# Patient Record
Sex: FEMALE | Race: BLACK OR AFRICAN AMERICAN | NOT HISPANIC OR LATINO | URBAN - METROPOLITAN AREA
[De-identification: names, ages, dates, MRNs, and addresses within clinical notes are randomized per-mention and may not be internally consistent; named-entity substitution may affect disease eponyms.]

---

## 2017-10-13 NOTE — H&P ADULT - HISTORY OF PRESENT ILLNESS
*confirmed medications, instructed patient to bring in.       Pt is a 41 yo F PMHx HTN, asthma (controlled, denies intubations), PUD (s/p tx 2yrs ago), subacute hypothyroidism, known to cardiologist complaining of intermittent sharp chest pain for about 2 years, now worsening in the past 3 weeks. Over this time period, patient states that originally her MD attributed her CP to asthma, then HTN, then weight gain, however it did not resolve with weight loss, antihypertensives, or tx of asthma; including a steroid taper 2 wks ago. She states the chest pain is L-sided with radiation to the face with numbness, most often when she walks up stairs, and she becomes short of breath, fatigued, and it does not always resolve with rest. Her exercise tolerance has decreased as she used to be able to walk 3 miles and now has sx with 1 flight of stairs. She was Rx Naproxen which did not resolve sx and is currently on Ranexa (started 1 wk ago) which seems to improve the pain. Patient underwent NST 10/4/17: equivocal ECG response to a submaximal heart rate achieved with exercise. Abnormal myocardial perfusion study with moderate area of anterior wall reversible myocardial ischemia, normal LV function is present. In light of patients abnormal stress test and CCS III/IV angina equivalent symptoms, she is recommended for cardiac catheterization with intervention if clinically indicated. *confirmed medications, instructed patient to bring in.       Pt is a 43 yo F former smoker PMHx HTN, asthma (controlled, denies intubations), PUD (s/p tx 2yrs ago), subacute hypothyroidism, known to cardiologist complaining of intermittent sharp chest pain for about 2 years, now worsening in the past 3 weeks. Over this time period, patient states that originally her MD attributed her CP to asthma, then HTN, then weight gain, however it did not resolve with weight loss, antihypertensives, or tx of asthma; including a steroid taper 2 wks ago. She states the chest pain is L-sided with radiation to the face with numbness, most often when she walks up stairs, and she becomes short of breath, fatigued, and it does not always resolve with rest. Her exercise tolerance has decreased as she used to be able to walk 3 miles and now has sx with 1 flight of stairs. She was Rx Naproxen which did not resolve sx and is currently on Ranexa (started 1 wk ago) which seems to improve the pain. Denies syncope, HA, dizziness, lightheadedness, LOC, palpitations, n/v/d, fever, chills, diaphoresis, LE edema, orthopnea, PND. Patient underwent NST 10/4/17: equivocal ECG response to a submaximal heart rate achieved with exercise. Abnormal myocardial perfusion study with moderate area of anterior wall reversible myocardial ischemia, normal LV function is present. In light of patients abnormal stress test and CCS III/IV angina equivalent symptoms, she is recommended for cardiac catheterization with intervention if clinically indicated. 41 y/o F former smoker w/ PMHX HTN, Asthma (controlled, denies intubations), PUD (s/p treatment 2yrs ago, denies h/o GI bleeding), Subacute Hypothyroidism, known to her cardiologist for complaints of intermittent sharp C/P for about 2 years, now worsening in the past 3 weeks. Patient states that originally her MD attributed her CP to asthma, then to her HTN, then to her weight gain. However, symptoms did not resolve with weight loss, antihypertensive medications and treatment of her asthma including a steroid taper 2 weeks ago. She states her chest pain is L-sided with radiation to the face w/ associated numbness, and most often occurs when she walks 1 flight of stairs; however her C/P also occurs at rest (CCS Angina Class 4 Sx). She becomes SOB, fatigued, and states it does not always resolve with rest. Her exercise tolerance has decreased as she used to be able to walk 3 miles and now has symptoms with 1 flight of stairs. She was prescribed Naproxen which did not resolve symptoms and is currently on Ranexa (started 1 week ago) which seems to improve her C/P. Denies syncope, HA, dizziness, lightheadedness, palpitations, N/V, diaphoresis, LE edema, orthopnea, PND. Patient underwent NST 10/4/17: equivocal ECG response to a submaximal heart rate achieved with exercise. Abnormal myocardial perfusion study with moderate area of anterior wall reversible myocardial ischemia, normal LV function is present.     Due to patients risk factors, abnormal NST and CCS Class 4 anginal symptoms, she is recommended for cardiac catheterization w/ possible intervention.     Of Note, pt had a hysterectomy 6 and ½ yrs ago. 41 y/o F former smoker w/ PMHX HTN, Asthma (controlled, denies intubations), PUD (s/p treatment 2yrs ago, denies h/o GI bleeding), Subacute Hypothyroidism, known to her cardiologist for complaints of intermittent sharp C/P for about 2 years, now worsening in the past 3 weeks. Patient states that originally her MD attributed her CP to asthma, then to her HTN, then to her weight gain. However, symptoms did not resolve with weight loss, antihypertensive medications and treatment of her asthma including a steroid taper 2 weeks ago. She states her chest pain is L-sided with radiation to the face w/ associated numbness, and most often occurs when she walks 1 flight of stairs; however her C/P also occurs at rest (CCS Angina Class 4 Sx). She becomes SOB, fatigued, and states it does not always resolve with rest. Her exercise tolerance has decreased as she used to be able to walk 3 miles and now has symptoms with 1 flight of stairs. She was prescribed Naproxen which did not resolve symptoms and is currently on Ranexa (started 1 week ago) which seems to improve her C/P. Denies syncope, HA, dizziness, lightheadedness, palpitations, N/V, diaphoresis, LE edema, orthopnea, PND. Patient underwent Abnormal NST 10/4/17: equivocal ECG response to a submaximal heart rate achieved with exercise. Abnormal myocardial perfusion study with moderate area of anterior wall reversible myocardial ischemia, normal LV function is present.     Due to patients risk factors, abnormal NST and CCS Class 4 anginal symptoms, she is recommended for cardiac catheterization w/ possible intervention.     Of Note, pt had a hysterectomy 6 and ½ yrs ago.

## 2017-10-13 NOTE — H&P ADULT - NSHPLABSRESULTS_GEN_ALL_CORE
12.9   7.9   )-----------( 255      ( 16 Oct 2017 12:21 )             37.8     PT/INR - ( 16 Oct 2017 12:21 )   PT: 11.5 sec;   INR: 1.04          PTT - ( 16 Oct 2017 12:21 )  PTT:30.7 sec 12.9   7.9   )-----------( 255      ( 16 Oct 2017 12:21 )             37.8     PT/INR - ( 16 Oct 2017 12:21 )   PT: 11.5 sec;   INR: 1.04          PTT - ( 16 Oct 2017 12:21 )  PTT:30.7 sec    10-16    141  |  102  |  12  ----------------------------<  102<H>  3.7   |  25  |  0.87    Ca    9.6      16 Oct 2017 12:21    TPro  7.7  /  Alb  4.5  /  TBili  0.3  /  DBili  <0.2  /  AST  11  /  ALT  6<L>  /  AlkPhos  82  10-16

## 2017-10-13 NOTE — H&P ADULT - NSHPPHYSICALEXAM_GEN_ALL_CORE
V/S 	BP: 144/ 95	  HR: 80      RR: 16	T: 98	  O2: 98% RA   	  GEN: NAD  PULM:  CTA B/L  CARD: No JVD B/L, RRR, S1 and S2   ABD: +BS, NT, soft/ND	  EXT: No Edema B/L LE  NEURO: A+Ox3, no focal deficit  PULSES: 2+ Radial b/l, 2+ Femoral b/l (no bruit b/l), DP 2+ b/l, PT +Faint B/L  R and L Colten Test Passed  ASA III, Mallampati III  EKG V/S 	BP: 144/ 95	  HR: 80      RR: 16	T: 98	  O2: 98% RA   	  GEN: NAD  PULM:  CTA B/L  CARD: No JVD B/L, RRR, S1 and S2   ABD: +BS, NT, soft/ND	  EXT: No Edema B/L LE  NEURO: A+Ox3, no focal deficit  PULSES: 2+ Radial b/l, 2+ Femoral b/l (no bruit b/l), DP 2+ b/l, PT +Faint B/L  R and L Colten Test Passed  ASA III, Mallampati III  EKG NSR @ 71bpm, j point elevation V1-V3, TW flattening in III

## 2017-10-13 NOTE — H&P ADULT - ASSESSMENT
43 y/o F former smoker w/ PMHX HTN, Asthma, PUD, Subacute Hypothyroidism, CCS Angina Class 4 Sx, Abnormal NST    Due to patients risk factors, abnormal NST and CCS Class 4 anginal symptoms, she is recommended for cardiac catheterization w/ possible intervention.     Of Note, pt had a hysterectomy 6 and ½ yrs ago.      H/H 12.9/37. Pt denies bleeding, GI bleeding, hematemesis, hematuria, BRBPR or melena .   ASA 81mg taken this AM. Additional ASA 243mg and Plavix 600mh PO pre-cath.    Cr. IV NS@ 50 cc/hr pre-cath.  Risks & benefits of procedure and alternative therapy have been explained to the patient including but not limited to: allergic reaction, bleeding w/possible need for blood transfusion, infection, renal and vascular compromise, limb damage, arrhythmia, stroke, vessel dissection/perforation, Myocardial infarction, emergent CABG. Informed consent obtained and in chart. 43 y/o F former smoker w/ PMHX HTN, Asthma, PUD, Subacute Hypothyroidism, CCS Angina Class 4 Sx, Abnormal NST    Due to patients risk factors, abnormal NST and CCS Class 4 anginal symptoms, she is recommended for cardiac catheterization w/ possible intervention.     Of Note, pt had a hysterectomy 6 and ½ yrs ago.      H/H 12.9/37. Pt denies bleeding, GI bleeding, hematemesis, hematuria, BRBPR or melena .   ASA 81mg taken this AM. Additional ASA 243mg and Plavix 600mg PO pre-cath.    Cr. IV NS@ 50 cc/hr pre-cath.  Risks & benefits of procedure and alternative therapy have been explained to the patient including but not limited to: allergic reaction, bleeding w/possible need for blood transfusion, infection, renal and vascular compromise, limb damage, arrhythmia, stroke, vessel dissection/perforation, Myocardial infarction, emergent CABG. Informed consent obtained and in chart. 41 y/o F former smoker w/ PMHX HTN, Asthma, PUD, Subacute Hypothyroidism, CCS Angina Class 4 Sx, Abnormal NST    Due to patients risk factors, abnormal NST and CCS Class 4 anginal symptoms, she is recommended for cardiac catheterization w/ possible intervention.     Of Note, pt had a hysterectomy 6 and ½ yrs ago.      H/H 12.9/37. Pt denies bleeding, GI bleeding, hematemesis, hematuria, BRBPR or melena .   ASA 81mg taken this AM. Additional ASA 243mg and Plavix 600mg PO pre-cath.    Cr. 0.87, IV NS@ 50 cc/hr pre-cath.  Risks & benefits of procedure and alternative therapy have been explained to the patient including but not limited to: allergic reaction, bleeding w/possible need for blood transfusion, infection, renal and vascular compromise, limb damage, arrhythmia, stroke, vessel dissection/perforation, Myocardial infarction, emergent CABG. Informed consent obtained and in chart.

## 2017-10-16 ENCOUNTER — OUTPATIENT (OUTPATIENT)
Dept: OUTPATIENT SERVICES | Facility: HOSPITAL | Age: 42
LOS: 1 days | Discharge: NURSING, REHAB OR LONG-TERM CARE | End: 2017-10-16
Payer: COMMERCIAL

## 2017-10-16 DIAGNOSIS — Z90.710 ACQUIRED ABSENCE OF BOTH CERVIX AND UTERUS: Chronic | ICD-10-CM

## 2017-10-16 LAB
ALBUMIN SERPL ELPH-MCNC: 4.5 G/DL — SIGNIFICANT CHANGE UP (ref 3.3–5)
ALP SERPL-CCNC: 82 U/L — SIGNIFICANT CHANGE UP (ref 40–120)
ALT FLD-CCNC: 6 U/L — LOW (ref 10–45)
ANION GAP SERPL CALC-SCNC: 14 MMOL/L — SIGNIFICANT CHANGE UP (ref 5–17)
APTT BLD: 30.7 SEC — SIGNIFICANT CHANGE UP (ref 27.5–37.4)
AST SERPL-CCNC: 11 U/L — SIGNIFICANT CHANGE UP (ref 10–40)
BASOPHILS NFR BLD AUTO: 0.5 % — SIGNIFICANT CHANGE UP (ref 0–2)
BILIRUB SERPL-MCNC: 0.3 MG/DL — SIGNIFICANT CHANGE UP (ref 0.2–1.2)
BUN SERPL-MCNC: 12 MG/DL — SIGNIFICANT CHANGE UP (ref 7–23)
CALCIUM SERPL-MCNC: 9.6 MG/DL — SIGNIFICANT CHANGE UP (ref 8.4–10.5)
CHLORIDE SERPL-SCNC: 102 MMOL/L — SIGNIFICANT CHANGE UP (ref 96–108)
CHOLEST SERPL-MCNC: 169 MG/DL — SIGNIFICANT CHANGE UP (ref 10–199)
CK MB CFR SERPL CALC: 1.5 NG/ML — SIGNIFICANT CHANGE UP (ref 0–6.7)
CK SERPL-CCNC: 118 U/L — SIGNIFICANT CHANGE UP (ref 25–170)
CO2 SERPL-SCNC: 25 MMOL/L — SIGNIFICANT CHANGE UP (ref 22–31)
CREAT SERPL-MCNC: 0.87 MG/DL — SIGNIFICANT CHANGE UP (ref 0.5–1.3)
CRP SERPL-MCNC: 0.6 MG/DL — HIGH (ref 0–0.4)
EOSINOPHIL NFR BLD AUTO: 1.8 % — SIGNIFICANT CHANGE UP (ref 0–6)
GLUCOSE SERPL-MCNC: 102 MG/DL — HIGH (ref 70–99)
HBA1C BLD-MCNC: 5.5 % — SIGNIFICANT CHANGE UP (ref 4–5.6)
HCG SERPL-ACNC: <.1 MIU/ML — SIGNIFICANT CHANGE UP
HCT VFR BLD CALC: 37.8 % — SIGNIFICANT CHANGE UP (ref 34.5–45)
HDLC SERPL-MCNC: 60 MG/DL — SIGNIFICANT CHANGE UP (ref 40–125)
HGB BLD-MCNC: 12.9 G/DL — SIGNIFICANT CHANGE UP (ref 11.5–15.5)
INR BLD: 1.04 — SIGNIFICANT CHANGE UP (ref 0.88–1.16)
LIPID PNL WITH DIRECT LDL SERPL: 89 MG/DL — SIGNIFICANT CHANGE UP
LYMPHOCYTES # BLD AUTO: 27.1 % — SIGNIFICANT CHANGE UP (ref 13–44)
MCHC RBC-ENTMCNC: 27.9 PG — SIGNIFICANT CHANGE UP (ref 27–34)
MCHC RBC-ENTMCNC: 34.1 G/DL — SIGNIFICANT CHANGE UP (ref 32–36)
MCV RBC AUTO: 81.8 FL — SIGNIFICANT CHANGE UP (ref 80–100)
MONOCYTES NFR BLD AUTO: 7.1 % — SIGNIFICANT CHANGE UP (ref 2–14)
NEUTROPHILS NFR BLD AUTO: 63.5 % — SIGNIFICANT CHANGE UP (ref 43–77)
PLATELET # BLD AUTO: 255 K/UL — SIGNIFICANT CHANGE UP (ref 150–400)
POTASSIUM SERPL-MCNC: 3.7 MMOL/L — SIGNIFICANT CHANGE UP (ref 3.5–5.3)
POTASSIUM SERPL-SCNC: 3.7 MMOL/L — SIGNIFICANT CHANGE UP (ref 3.5–5.3)
PROT SERPL-MCNC: 7.7 G/DL — SIGNIFICANT CHANGE UP (ref 6–8.3)
PROTHROM AB SERPL-ACNC: 11.5 SEC — SIGNIFICANT CHANGE UP (ref 9.8–12.7)
RBC # BLD: 4.62 M/UL — SIGNIFICANT CHANGE UP (ref 3.8–5.2)
RBC # FLD: 13.8 % — SIGNIFICANT CHANGE UP (ref 10.3–16.9)
SODIUM SERPL-SCNC: 141 MMOL/L — SIGNIFICANT CHANGE UP (ref 135–145)
TOTAL CHOLESTEROL/HDL RATIO MEASUREMENT: 2.8 RATIO — LOW (ref 3.3–7.1)
TRIGL SERPL-MCNC: 99 MG/DL — SIGNIFICANT CHANGE UP (ref 10–149)
WBC # BLD: 7.9 K/UL — SIGNIFICANT CHANGE UP (ref 3.8–10.5)
WBC # FLD AUTO: 7.9 K/UL — SIGNIFICANT CHANGE UP (ref 3.8–10.5)

## 2017-10-16 PROCEDURE — 85025 COMPLETE CBC W/AUTO DIFF WBC: CPT

## 2017-10-16 PROCEDURE — 93458 L HRT ARTERY/VENTRICLE ANGIO: CPT | Mod: 26

## 2017-10-16 PROCEDURE — 85610 PROTHROMBIN TIME: CPT

## 2017-10-16 PROCEDURE — 93010 ELECTROCARDIOGRAM REPORT: CPT

## 2017-10-16 PROCEDURE — 82550 ASSAY OF CK (CPK): CPT

## 2017-10-16 PROCEDURE — 93458 L HRT ARTERY/VENTRICLE ANGIO: CPT

## 2017-10-16 PROCEDURE — C1887: CPT

## 2017-10-16 PROCEDURE — 85730 THROMBOPLASTIN TIME PARTIAL: CPT

## 2017-10-16 PROCEDURE — C1769: CPT

## 2017-10-16 PROCEDURE — 83036 HEMOGLOBIN GLYCOSYLATED A1C: CPT

## 2017-10-16 PROCEDURE — 93005 ELECTROCARDIOGRAM TRACING: CPT

## 2017-10-16 PROCEDURE — 84702 CHORIONIC GONADOTROPIN TEST: CPT

## 2017-10-16 PROCEDURE — 82553 CREATINE MB FRACTION: CPT

## 2017-10-16 PROCEDURE — 80053 COMPREHEN METABOLIC PANEL: CPT

## 2017-10-16 PROCEDURE — 86140 C-REACTIVE PROTEIN: CPT

## 2017-10-16 PROCEDURE — 82248 BILIRUBIN DIRECT: CPT

## 2017-10-16 PROCEDURE — 80061 LIPID PANEL: CPT

## 2017-10-16 RX ORDER — CLOPIDOGREL BISULFATE 75 MG/1
600 TABLET, FILM COATED ORAL ONCE
Qty: 0 | Refills: 0 | Status: COMPLETED | OUTPATIENT
Start: 2017-10-16 | End: 2017-10-16

## 2017-10-16 RX ORDER — ASPIRIN/CALCIUM CARB/MAGNESIUM 324 MG
243 TABLET ORAL ONCE
Qty: 0 | Refills: 0 | Status: COMPLETED | OUTPATIENT
Start: 2017-10-16 | End: 2017-10-16

## 2017-10-16 RX ORDER — MONTELUKAST 4 MG/1
1 TABLET, CHEWABLE ORAL
Qty: 0 | Refills: 0 | COMMUNITY

## 2017-10-16 RX ORDER — ASPIRIN/CALCIUM CARB/MAGNESIUM 324 MG
1 TABLET ORAL
Qty: 0 | Refills: 0 | COMMUNITY

## 2017-10-16 RX ORDER — SODIUM CHLORIDE 9 MG/ML
500 INJECTION INTRAMUSCULAR; INTRAVENOUS; SUBCUTANEOUS
Qty: 0 | Refills: 0 | Status: DISCONTINUED | OUTPATIENT
Start: 2017-10-16 | End: 2017-10-16

## 2017-10-16 RX ORDER — RANOLAZINE 500 MG/1
1 TABLET, FILM COATED, EXTENDED RELEASE ORAL
Qty: 0 | Refills: 0 | COMMUNITY

## 2017-10-16 RX ORDER — ALBUTEROL 90 UG/1
2 AEROSOL, METERED ORAL
Qty: 0 | Refills: 0 | COMMUNITY

## 2017-10-16 RX ORDER — AMLODIPINE BESYLATE 2.5 MG/1
1 TABLET ORAL
Qty: 0 | Refills: 0 | COMMUNITY

## 2017-10-16 RX ORDER — SODIUM CHLORIDE 9 MG/ML
1000 INJECTION INTRAMUSCULAR; INTRAVENOUS; SUBCUTANEOUS
Qty: 0 | Refills: 0 | Status: DISCONTINUED | OUTPATIENT
Start: 2017-10-16 | End: 2017-10-16

## 2017-10-16 RX ORDER — CHLORHEXIDINE GLUCONATE 213 G/1000ML
1 SOLUTION TOPICAL ONCE
Qty: 0 | Refills: 0 | Status: DISCONTINUED | OUTPATIENT
Start: 2017-10-16 | End: 2017-10-16

## 2017-10-16 RX ADMIN — Medication 243 MILLIGRAM(S): at 12:48

## 2017-10-16 RX ADMIN — CLOPIDOGREL BISULFATE 600 MILLIGRAM(S): 75 TABLET, FILM COATED ORAL at 12:48

## 2017-10-16 RX ADMIN — SODIUM CHLORIDE 50 MILLILITER(S): 9 INJECTION INTRAMUSCULAR; INTRAVENOUS; SUBCUTANEOUS at 12:48

## 2017-10-16 NOTE — CONSULT NOTE ADULT - SUBJECTIVE AND OBJECTIVE BOX
CHIEF COMPLAINT: currently denies complaint    HISTORY OF PRESENT ILLNESS:  Ms. Carpio is a 42 year-old female, former smoker, PMHx HTN, Asthma, H. Pylori PUD (s/p treatment 2yrs ago), subacute hypothyroidism, known to her cardiologist for complaints of intermittent sharp C/P for about 2 years, now worsening in the past 3 weeks. MD originally attributed CP to asthma, then to HTN, then to weight gain but symptoms did not resolve with weight loss, antihypertensive medications and treatment of her asthma including a steroid taper 2 weeks ago. Describes as L-sided with radiation to the face w/ associated numbness, and most often occurs when she walks 1 flight of stairs. She becomes SOB, fatigued, and states it does not always resolve with rest. ET limited to pain. No change with Naproxen, some relief with Ranexa. NST 10/4/17: equivocal ECG response to a submaximal heart rate achieved with exercise. Abnormal myocardial perfusion study with moderate area of anterior wall reversible myocardial ischemia, normal LV function is present.     Due to patients risk factors, abnormal NST and CCS Class 4 anginal symptoms, she is recommended for cardiac catheterization w/ possible intervention.     Cardiac cath showed non-obstructive CAD, normal LV function.     Now consulted for prevention.     PAST MEDICAL & SURGICAL HISTORY:  Fibroid  Asthma  HTN (hypertension)  H/O total hysterectomy     FAMILY HISTORY: maternal grandmother and uncle with "heart problems", denies first-degree CVD.     Allergies:   No Known Allergies    HOME MEDICATIONS:  Norvasc 5mg daily  ASA EC 81mg daily  Ranexa 500mg BId  Singulair 10mg daily  Duo-neb inhaler PRN	    PHYSICAL EXAM:  HR: 72  BP: 138/86  RR: 18  SpO2: 99% RA  Wt(kg): 216lbs  Height: 5'6"  BMI: 34.9  	  LABS:	                  12.9   7.9   )-----------( 255      ( 16 Oct 2017 12:21 )             37.8     10-    141  |  102  |  12  ----------------------------<  102<H>  3.7   |  25  |  0.87    Ca    9.6      16 Oct 2017 12:21    TPro  7.7  /  Alb  4.5  /  TBili  0.3  /  DBili  <0.2  /  AST  11  /  ALT  6<L>  /  AlkPhos  82  10-16      Hemoglobin A1C, Whole Blood: 5.5 % (10-16 @ 12:21)    Cholesterol, Serum: 169 mg/dL (10-16 @ 12:21)  HDL Cholesterol, Serum: 60 mg/dL (10-16 @ 12:21)  Triglycerides, Serum: 99 mg/dL (10-16 @ 12:21)  Direct LDL: 89 mg/dL (10-16 @ 12:21)    C-Reactive Protein, Serum: 0.60 mg/dL (10-16 @ 12:21)    10 "S" ASSESSMENT PLAN: SMOKING, SITTING, SUGAR, SALT, SOME FATS, SOCIAL, SLEEP, SIGNS, AND MEDS:  Tobacco usage: previous smoker, quit 15 years ago  Stress: moderate to high -- actively  from , apartment lease issues, finances, bedbugs, sister just  a month ago, 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   ETOH: denies  Caffeine: 2 cups coffee/day with 3Tbsp sugar, creamer, and vanilla  Hormone Replacement: Has been on Prempro since her hysterectomy, but due to cost she has only completed 3 packets since surgery.   Sleep Disorder:   Inflammatory Condition:  Activity Level:  Current Diet:  Heart Failure:  Myopathy with Statins:  GI/ Issues:    ASSESSMENT/RECOMMENDATIONS: 	  Summary:     RECOMMENDATIONS:   Anti-platelet Therapy: DAPT per interventionalist recommendation.   Lipid Therapy:  Beta Blocker Therapy: Continue current therapy per your discretion.   ACE/ARB Therapy: Continue current therapy per your discretion.   Diet: Low-sodium, low-carbohydrate, Mediterranean diet. Written instruction on the Mediterranean diet was provided.   Exercise: 30-45 minutes most days of the week once cleared to do so by their referring cardiologist.   Smoking: This patient is a non-smoker.   Stress Management: Instruction on mindfulness meditation was provided.   Sleep:  Other:    Thank you for the opportunity to see this patient. Please feel free to contact [facility] if there are any questions, or if you feel that your patient would benefit from continued follow-up visits with the Program. CHIEF COMPLAINT: currently denies complaint    HISTORY OF PRESENT ILLNESS:  Ms. Carpio is a 42 year-old female, former smoker, PMHx HTN, Asthma, H. Pylori PUD (s/p treatment 2yrs ago), subacute hypothyroidism, known to her cardiologist for complaints of intermittent sharp C/P for about 2 years, now worsening in the past 3 weeks. MD originally attributed CP to asthma, then to HTN, then to weight gain but symptoms did not resolve with weight loss, antihypertensive medications and treatment of her asthma including a steroid taper 2 weeks ago. Describes as L-sided with radiation to the face w/ associated numbness, and most often occurs when she walks 1 flight of stairs. She becomes SOB, fatigued, and states it does not always resolve with rest. ET limited to pain. No change with Naproxen, some relief with Ranexa. NST 10/4/17: equivocal ECG response to a submaximal heart rate achieved with exercise. Abnormal myocardial perfusion study with moderate area of anterior wall reversible myocardial ischemia, normal LV function is present.     Due to patients risk factors, abnormal NST and CCS Class 4 anginal symptoms, she is recommended for cardiac catheterization w/ possible intervention.     Cardiac cath showed non-obstructive CAD, normal LV function.     Now consulted for prevention.     PAST MEDICAL & SURGICAL HISTORY:  Fibroid  Asthma  HTN (hypertension)  H/O total hysterectomy     FAMILY HISTORY: maternal grandmother and uncle with "heart problems", denies first-degree CVD.     Allergies:   No Known Allergies    HOME MEDICATIONS:  Norvasc 5mg daily  ASA EC 81mg daily  Ranexa 500mg BId  Singulair 10mg daily  Duo-neb inhaler PRN	    PHYSICAL EXAM:  HR: 72  BP: 138/86  RR: 18  SpO2: 99% RA  Wt(kg): 216lbs  Height: 5'6"  BMI: 34.9  	  LABS:	                  12.9   7.9   )-----------( 255      ( 16 Oct 2017 12:21 )             37.8     10-    141  |  102  |  12  ----------------------------<  102<H>  3.7   |  25  |  0.87    Ca    9.6      16 Oct 2017 12:21    TPro  7.7  /  Alb  4.5  /  TBili  0.3  /  DBili  <0.2  /  AST  11  /  ALT  6<L>  /  AlkPhos  82  10-16      Hemoglobin A1C, Whole Blood: 5.5 % (10-16 @ 12:21)    Cholesterol, Serum: 169 mg/dL (10-16 @ 12:21)  HDL Cholesterol, Serum: 60 mg/dL (10-16 @ 12:21)  Triglycerides, Serum: 99 mg/dL (10-16 @ 12:21)  Direct LDL: 89 mg/dL (10-16 @ 12:21)    C-Reactive Protein, Serum: 0.60 mg/dL (10-16 @ 12:21)    10 "S" ASSESSMENT PLAN: SMOKING, SITTING, SUGAR, SALT, SOME FATS, SOCIAL, SLEEP, SIGNS, AND MEDS:  Tobacco usage: previous smoker, quit 15 years ago  Stress: moderate to high -- actively  from , apartment lease issues, finances, bedbugs, sister just  a month ago, 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   ETOH: denies  Caffeine: 2 cups coffee/day with 3Tbsp sugar, creamer, and vanilla  Hormone Replacement: Has been on Prempro since her hysterectomy, but due to cost she has only completed 3 packets since surgery.   Sleep Disorder: 6 hrs/night, "always tired", (+) snoring, had a sleep study early  with no dx KORY.   Inflammatory Condition: denies RA, lupus, connective tissue disorder  Activity Level: denies receiving daily exercise.   Current Diet:  Br: sesame seed bagel with butter, 2 fried eggs with WW toast.   Lunch: Baked fish or chicken, quinoa,  [employer] meals and [employer] Fried Rice; says she has "cut down" on fried foods and still orders take out from time to time  Dinner: baked fish/chicken, steamed vegetables, sometimes spaghetti, says she makes sure to limit/use no salt when cooking at home.   Snacks/desserts: states "I am a sweet eater": chocolate, ice creams, donuts, brownies, cakes, 3 Musketeers, cheesecake. Makes smoothies with OJ/apple juice base with berries and veggies  Heart Failure: denies sx's  Myopathy with Statins: n/a  GI/ Issues: denies, intermittent reflux Rx PPI.   Pre-eclampsia/Pre-term births- denies    ASSESSMENT/RECOMMENDATIONS: 	  Summary:   42 year-old female, former smoker, PMHx HTN, Asthma with atypical chest pain and abnormal stress now s/p diagnostic cardiac cath with non-obstructive CAD and normal LV function. Consulted for cardiac prevention.     PMD: Dr. Frederick     RECOMMENDATIONS:   Anti-platelet Therapy: continue ASA EC 81mg daily   Lipid Therapy: no need to initiate statin at this time.   CCB: continue Norvasc per your discretion.   Diet: Low-sodium, low-carbohydrate, Mediterranean diet. Written instruction on the Mediterranean diet was provided. Discussed the hidden sugars and sodium she is getting in her diet. Talked about healthier ways to enjoy chocolate and sweets.   Exercise: 30-45 minutes most days of the week once cleared to do so by their referring cardiologist. Goal to lose 5-10 pounds in the next month.   Smoking: This patient is a non-smoker.   Stress Management: Instruction on mindfulness meditation was provided. Talked about making time for herself during the day (i.e. downtime to reflect and de-stress through exercise), as well as not stretching herself too thin with activities, work, school, and business ventures which can lead to stress, anxiety, and chest pain. Stressed importance of physical activity.   Sleep: Aim for 6-8 hours a night. Consider having another sleep study in the next few years if you remain tired throughout the day after diet and lifestyle modifications.   Blood sugars: A1C 5.5 and fasting FSBS 102. Talked about excess refined sugars, insulin use, and diabetes development. Consider an Insulin level given high FSBS.   Other: low-moderate 10yr cvd risk -- lifestyle modifications including diet and exercise are imperative at this point.     Thank you for the opportunity to see this patient. Please feel free to contact [facility] if there are any questions, or if you feel that your patient would benefit from continued follow-up visits with the Program. CHIEF COMPLAINT: currently denies complaint    HISTORY OF PRESENT ILLNESS:  Ms. Caprio is a 42 year-old female, former smoker, PMHx HTN, Asthma, H. Pylori PUD (s/p treatment 2yrs ago), subacute hypothyroidism, known to her cardiologist for complaints of intermittent sharp C/P for about 2 years, now worsening in the past 3 weeks. MD originally attributed CP to asthma, then to HTN, then to weight gain but symptoms did not resolve with weight loss, antihypertensive medications and treatment of her asthma including a steroid taper 2 weeks ago. Describes as L-sided with radiation to the face w/ associated numbness, and most often occurs when she walks 1 flight of stairs. She becomes SOB, fatigued, and states it does not always resolve with rest. ET limited to pain. No change with Naproxen, some relief with Ranexa. NST 10/4/17: equivocal ECG response to a submaximal heart rate achieved with exercise. Abnormal myocardial perfusion study with moderate area of anterior wall reversible myocardial ischemia, normal LV function is present.     Due to patients risk factors, abnormal NST and CCS Class 4 anginal symptoms, she is recommended for cardiac catheterization w/ possible intervention.     Cardiac cath showed non-obstructive CAD, normal LV function.     Now consulted for prevention.     PAST MEDICAL & SURGICAL HISTORY:  Fibroid  Asthma  HTN (hypertension)  H/O total hysterectomy     FAMILY HISTORY: maternal grandmother and uncle with "heart problems", denies first-degree CVD.     Allergies:   No Known Allergies    HOME MEDICATIONS:  Norvasc 5mg daily  ASA EC 81mg daily  Ranexa 500mg BId  Singulair 10mg daily  Duo-neb inhaler PRN	    PHYSICAL EXAM:  HR: 72  BP: 138/86  RR: 18  SpO2: 99% RA  Wt(kg): 216lbs  Height: 5'6"  BMI: 34.9  	  LABS:	                  12.9   7.9   )-----------( 255      ( 16 Oct 2017 12:21 )             37.8     10-    141  |  102  |  12  ----------------------------<  102<H>  3.7   |  25  |  0.87    Ca    9.6      16 Oct 2017 12:21    TPro  7.7  /  Alb  4.5  /  TBili  0.3  /  DBili  <0.2  /  AST  11  /  ALT  6<L>  /  AlkPhos  82  10-16      Hemoglobin A1C, Whole Blood: 5.5 % (10-16 @ 12:21)    Cholesterol, Serum: 169 mg/dL (10-16 @ 12:21)  HDL Cholesterol, Serum: 60 mg/dL (10-16 @ 12:21)  Triglycerides, Serum: 99 mg/dL (10-16 @ 12:21)  Direct LDL: 89 mg/dL (10-16 @ 12:21)    C-Reactive Protein, Serum: 0.60 mg/dL (10-16 @ 12:21)    10 "S" ASSESSMENT PLAN: SMOKING, SITTING, SUGAR, SALT, SOME FATS, SOCIAL, SLEEP, SIGNS, AND MEDS:  Tobacco usage: previous smoker, quit 15 years ago  Stress: moderate to high -- actively  from , apartment lease issues, finances, bedbugs, sister just  a month ago, 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   ETOH: denies  Caffeine: 2 cups coffee/day with 3Tbsp sugar, creamer, and vanilla  Hormone Replacement: Has been on Prempro since her hysterectomy, but due to cost she has only completed 3 packets since surgery.   Sleep Disorder: 6 hrs/night, "always tired", (+) snoring, had a sleep study early  with no dx KORY.   Inflammatory Condition: denies RA, lupus, connective tissue disorder  Activity Level: denies receiving daily exercise.   Current Diet:  Br: sesame seed bagel with butter, 2 fried eggs with WW toast.   Lunch: Baked fish or chicken, quinoa,  [employer] meals and [employer] Fried Rice; says she has "cut down" on fried foods and still orders take out from time to time  Dinner: baked fish/chicken, steamed vegetables, sometimes spaghetti, says she makes sure to limit/use no salt when cooking at home.   Snacks/desserts: states "I am a sweet eater": chocolate, ice creams, donuts, brownies, cakes, 3 Musketeers, cheesecake. Makes smoothies with OJ/apple juice base with berries and veggies  Heart Failure: denies sx's  Myopathy with Statins: n/a  GI/ Issues: denies, intermittent reflux Rx PPI.   Pre-eclampsia/Pre-term births- denies    ASSESSMENT/RECOMMENDATIONS: 	  Summary:   42 year-old female, former smoker, PMHx HTN, Asthma with atypical chest pain and abnormal stress now s/p diagnostic cardiac cath with non-obstructive CAD and normal LV function. Consulted for cardiac prevention.     PMD: Dr. Chino Arauz   [address] [place] [address]  Phone: [phone]    RECOMMENDATIONS:   Anti-platelet Therapy: continue ASA EC 81mg daily   Lipid Therapy: no need to initiate statin at this time.   CCB: continue Norvasc per your discretion.   Diet: Low-sodium, low-carbohydrate, Mediterranean diet. Written instruction on the Mediterranean diet was provided. Discussed the hidden sugars and sodium she is getting in her diet. Talked about healthier ways to enjoy chocolate and sweets.   Exercise: 30-45 minutes most days of the week once cleared to do so by their referring cardiologist. Goal to lose 5-10 pounds in the next month.   Smoking: This patient is a non-smoker.   Stress Management: Instruction on mindfulness meditation was provided. Talked about making time for herself during the day (i.e. downtime to reflect and de-stress through exercise), as well as not stretching herself too thin with activities, work, school, and business ventures which can lead to stress, anxiety, and chest pain. Stressed importance of physical activity.   Sleep: Aim for 6-8 hours a night. Consider having another sleep study in the next few years if you remain tired throughout the day after diet and lifestyle modifications.   Blood sugars: A1C 5.5 and fasting FSBS 102. Talked about excess refined sugars, insulin use, and diabetes development. Consider an Insulin level given high FSBS.   Other: low-moderate 10yr cvd risk -- lifestyle modifications including diet and exercise are imperative at this point.     Thank you for the opportunity to see this patient. Please feel free to contact [facility] if there are any questions, or if you feel that your patient would benefit from continued follow-up visits with the Program.

## 2017-10-16 NOTE — PROGRESS NOTE ADULT - SUBJECTIVE AND OBJECTIVE BOX
Interventional Cardiology HERLINDA MERRILLA Discharge Note    Patient without complaints. Ambulated and voided without difficulties    Afebrile, VSS    Ext:  Right  Radial : no hematoma,  no   bleeding, dressing; C/D/I    Pulses:    intact RAD 2+     A/P:    43 y/o F former smoker w/ PMHX HTN, Asthma, PUD, Subacute Hypothyroidism, CCS Angina Class 4 Sx, Abnormal NST.   cardiac cath 10/16/17: normal coronaries, normal EF, R radial access    1.	Stable for discharge as per attending Dr. FIDEL Morataya  after bed rest, pt voids, wrist stable and 30 minutes of ambulation.  2.	Follow-up with PMD/Cardiologist Regla  in 1-2 weeks  3.	Discharged forms signed and copies in chart, stop ranexa

## 2017-10-19 DIAGNOSIS — I25.10 ATHEROSCLEROTIC HEART DISEASE OF NATIVE CORONARY ARTERY WITHOUT ANGINA PECTORIS: ICD-10-CM

## 2017-10-19 DIAGNOSIS — R94.39 ABNORMAL RESULT OF OTHER CARDIOVASCULAR FUNCTION STUDY: ICD-10-CM

## 2017-11-22 ENCOUNTER — APPOINTMENT (OUTPATIENT)
Dept: HEART AND VASCULAR | Facility: CLINIC | Age: 42
End: 2017-11-22

## 2018-09-24 PROBLEM — D25.9 LEIOMYOMA OF UTERUS, UNSPECIFIED: Chronic | Status: ACTIVE | Noted: 2017-10-13

## 2018-09-24 PROBLEM — J45.909 UNSPECIFIED ASTHMA, UNCOMPLICATED: Chronic | Status: ACTIVE | Noted: 2017-10-13

## 2018-09-24 PROBLEM — I10 ESSENTIAL (PRIMARY) HYPERTENSION: Chronic | Status: ACTIVE | Noted: 2017-10-13

## 2018-09-27 ENCOUNTER — APPOINTMENT (OUTPATIENT)
Dept: HEART AND VASCULAR | Facility: CLINIC | Age: 43
End: 2018-09-27
Payer: COMMERCIAL

## 2018-09-27 ENCOUNTER — LABORATORY RESULT (OUTPATIENT)
Age: 43
End: 2018-09-27

## 2018-09-27 VITALS — SYSTOLIC BLOOD PRESSURE: 140 MMHG | DIASTOLIC BLOOD PRESSURE: 90 MMHG

## 2018-09-27 VITALS — WEIGHT: 212 LBS | HEIGHT: 66 IN | BODY MASS INDEX: 34.07 KG/M2

## 2018-09-27 DIAGNOSIS — Z87.891 PERSONAL HISTORY OF NICOTINE DEPENDENCE: ICD-10-CM

## 2018-09-27 DIAGNOSIS — Z82.49 FAMILY HISTORY OF ISCHEMIC HEART DISEASE AND OTHER DISEASES OF THE CIRCULATORY SYSTEM: ICD-10-CM

## 2018-09-27 DIAGNOSIS — Z86.018 PERSONAL HISTORY OF OTHER BENIGN NEOPLASM: ICD-10-CM

## 2018-09-27 DIAGNOSIS — Z87.09 PERSONAL HISTORY OF OTHER DISEASES OF THE RESPIRATORY SYSTEM: ICD-10-CM

## 2018-09-27 PROCEDURE — 99214 OFFICE O/P EST MOD 30 MIN: CPT

## 2018-09-27 PROCEDURE — 93000 ELECTROCARDIOGRAM COMPLETE: CPT

## 2018-09-28 LAB
24R-OH-CALCIDIOL SERPL-MCNC: 79 PG/ML
ALBUMIN SERPL ELPH-MCNC: 4.3 G/DL
ALP BLD-CCNC: 83 U/L
ALT SERPL-CCNC: 6 U/L
ANION GAP SERPL CALC-SCNC: 14 MMOL/L
AST SERPL-CCNC: 13 U/L
BASOPHILS # BLD AUTO: 0.03 K/UL
BASOPHILS NFR BLD AUTO: 0.4 %
BILIRUB SERPL-MCNC: 0.3 MG/DL
BUN SERPL-MCNC: 11 MG/DL
CALCIUM SERPL-MCNC: 9.5 MG/DL
CHLORIDE SERPL-SCNC: 101 MMOL/L
CHOLEST SERPL-MCNC: 159 MG/DL
CHOLEST/HDLC SERPL: 2.8 RATIO
CO2 SERPL-SCNC: 27 MMOL/L
CREAT SERPL-MCNC: 0.8 MG/DL
EOSINOPHIL # BLD AUTO: 0.08 K/UL
EOSINOPHIL NFR BLD AUTO: 1 %
GLUCOSE SERPL-MCNC: 96 MG/DL
HBA1C MFR BLD HPLC: 5.8 %
HCT VFR BLD CALC: 38.8 %
HDLC SERPL-MCNC: 56 MG/DL
HGB BLD-MCNC: 11.8 G/DL
IMM GRANULOCYTES NFR BLD AUTO: 0.3 %
LDLC SERPL CALC-MCNC: 92 MG/DL
LYMPHOCYTES # BLD AUTO: 2.41 K/UL
LYMPHOCYTES NFR BLD AUTO: 31 %
MAN DIFF?: NORMAL
MCHC RBC-ENTMCNC: 25.7 PG
MCHC RBC-ENTMCNC: 30.4 GM/DL
MCV RBC AUTO: 84.5 FL
MONOCYTES # BLD AUTO: 0.56 K/UL
MONOCYTES NFR BLD AUTO: 7.2 %
NEUTROPHILS # BLD AUTO: 4.67 K/UL
NEUTROPHILS NFR BLD AUTO: 60.1 %
PLATELET # BLD AUTO: 295 K/UL
POTASSIUM SERPL-SCNC: 3.7 MMOL/L
PROT SERPL-MCNC: 7.2 G/DL
RBC # BLD: 4.59 M/UL
RBC # FLD: 15.8 %
SODIUM SERPL-SCNC: 142 MMOL/L
TRIGL SERPL-MCNC: 55 MG/DL
WBC # FLD AUTO: 7.77 K/UL

## 2018-10-03 LAB
FOLATE SERPL-MCNC: 9.1 NG/ML
T3RU NFR SERPL: 1.04 INDEX
T4 FREE SERPL-MCNC: 1.4 NG/DL
T4 SERPL-MCNC: 8.2 UG/DL
TSH SERPL-ACNC: 0.69 UIU/ML
VIT B12 SERPL-MCNC: 548 PG/ML

## 2019-01-11 ENCOUNTER — APPOINTMENT (OUTPATIENT)
Dept: HEART AND VASCULAR | Facility: CLINIC | Age: 44
End: 2019-01-11
Payer: COMMERCIAL

## 2019-01-11 VITALS
WEIGHT: 212 LBS | BODY MASS INDEX: 34.07 KG/M2 | DIASTOLIC BLOOD PRESSURE: 100 MMHG | HEART RATE: 72 BPM | HEIGHT: 66 IN | RESPIRATION RATE: 12 BRPM | SYSTOLIC BLOOD PRESSURE: 160 MMHG

## 2019-01-11 PROCEDURE — 99214 OFFICE O/P EST MOD 30 MIN: CPT

## 2019-01-11 RX ORDER — TETRACYCLINE HYDROCHLORIDE 250 MG/1
250 CAPSULE ORAL
Qty: 20 | Refills: 0 | Status: DISCONTINUED | COMMUNITY
Start: 2018-10-03

## 2019-01-11 NOTE — PHYSICAL EXAM
[General Appearance - Well Developed] : well developed [Normal Appearance] : normal appearance [Well Groomed] : well groomed [General Appearance - Well Nourished] : well nourished [No Deformities] : no deformities [General Appearance - In No Acute Distress] : no acute distress [Normal Oral Mucosa] : normal oral mucosa [No Oral Pallor] : no oral pallor [No Oral Cyanosis] : no oral cyanosis [Normal Jugular Venous A Waves Present] : normal jugular venous A waves present [Normal Jugular Venous V Waves Present] : normal jugular venous V waves present [No Jugular Venous Bermudez A Waves] : no jugular venous bermudez A waves [Respiration, Rhythm And Depth] : normal respiratory rhythm and effort [Exaggerated Use Of Accessory Muscles For Inspiration] : no accessory muscle use [Auscultation Breath Sounds / Voice Sounds] : lungs were clear to auscultation bilaterally [Heart Rate And Rhythm] : heart rate and rhythm were normal [Heart Sounds] : normal S1 and S2 [Arterial Pulses Normal] : the arterial pulses were normal [Edema] : no peripheral edema present [Abdomen Soft] : soft [Abdomen Tenderness] : non-tender [Abdomen Mass (___ Cm)] : no abdominal mass palpated [Abnormal Walk] : normal gait [Gait - Sufficient For Exercise Testing] : the gait was sufficient for exercise testing [Nail Clubbing] : no clubbing of the fingernails [Cyanosis, Localized] : no localized cyanosis [Petechial Hemorrhages (___cm)] : no petechial hemorrhages [] : no ischemic changes

## 2019-01-11 NOTE — DISCUSSION/SUMMARY
[FreeTextEntry1] : 1. Hypertension: Will restart lisinopril 20 mg daily in conjunction with Norvasc 5 mg daily advised low salt diet.\par 2. Hypothyroidism: Will repeat TFTs and reorder Synthroid.

## 2019-01-11 NOTE — HISTORY OF PRESENT ILLNESS
[FreeTextEntry1] : 43-year-old female with a past medical history of hypertension comes in for evaluation of high blood pressure. Patient went for preadmission testing found to have systolic pressure 160/100. Patient does report mild headaches on occasion. However, does admit to excessive stress in her life at this time. Patient also reports not taking Synthroid for some time.

## 2019-01-14 LAB
25(OH)D3 SERPL-MCNC: 11.8 NG/ML
ALBUMIN SERPL ELPH-MCNC: 4.9 G/DL
ALP BLD-CCNC: 91 U/L
ALT SERPL-CCNC: 11 U/L
ANION GAP SERPL CALC-SCNC: 14 MMOL/L
AST SERPL-CCNC: 11 U/L
BASOPHILS # BLD AUTO: 0.03 K/UL
BASOPHILS NFR BLD AUTO: 0.4 %
BILIRUB SERPL-MCNC: 0.4 MG/DL
BUN SERPL-MCNC: 11 MG/DL
CALCIUM SERPL-MCNC: 9.5 MG/DL
CHLORIDE SERPL-SCNC: 101 MMOL/L
CHOLEST SERPL-MCNC: 182 MG/DL
CHOLEST/HDLC SERPL: 2.7 RATIO
CO2 SERPL-SCNC: 25 MMOL/L
CREAT SERPL-MCNC: 0.86 MG/DL
EOSINOPHIL # BLD AUTO: 0.07 K/UL
EOSINOPHIL NFR BLD AUTO: 1 %
FOLATE SERPL-MCNC: 10.1 NG/ML
GLUCOSE SERPL-MCNC: 107 MG/DL
HBA1C MFR BLD HPLC: 5.8 %
HCT VFR BLD CALC: 42.1 %
HDLC SERPL-MCNC: 68 MG/DL
HGB BLD-MCNC: 13.7 G/DL
IMM GRANULOCYTES NFR BLD AUTO: 0.3 %
LDLC SERPL CALC-MCNC: 101 MG/DL
LYMPHOCYTES # BLD AUTO: 2.27 K/UL
LYMPHOCYTES NFR BLD AUTO: 31.3 %
MAGNESIUM SERPL-MCNC: 2.2 MG/DL
MAN DIFF?: NORMAL
MCHC RBC-ENTMCNC: 27.7 PG
MCHC RBC-ENTMCNC: 32.5 GM/DL
MCV RBC AUTO: 85.2 FL
MONOCYTES # BLD AUTO: 0.4 K/UL
MONOCYTES NFR BLD AUTO: 5.5 %
NEUTROPHILS # BLD AUTO: 4.46 K/UL
NEUTROPHILS NFR BLD AUTO: 61.5 %
PHOSPHATE SERPL-MCNC: 3.1 MG/DL
PLATELET # BLD AUTO: 312 K/UL
POTASSIUM SERPL-SCNC: 3.6 MMOL/L
PROT SERPL-MCNC: 7.7 G/DL
RBC # BLD: 4.94 M/UL
RBC # FLD: 14.5 %
SODIUM SERPL-SCNC: 140 MMOL/L
T3FREE SERPL-MCNC: 3.01 PG/ML
T4 FREE SERPL-MCNC: 1.2 NG/DL
T4 SERPL-MCNC: 7.9 UG/DL
TRIGL SERPL-MCNC: 63 MG/DL
TSH SERPL-ACNC: 0.69 UIU/ML
VIT B12 SERPL-MCNC: 1162 PG/ML
WBC # FLD AUTO: 7.25 K/UL

## 2019-01-23 ENCOUNTER — APPOINTMENT (OUTPATIENT)
Dept: HEART AND VASCULAR | Facility: CLINIC | Age: 44
End: 2019-01-23

## 2019-02-25 ENCOUNTER — APPOINTMENT (OUTPATIENT)
Dept: HEART AND VASCULAR | Facility: CLINIC | Age: 44
End: 2019-02-25
Payer: COMMERCIAL

## 2019-02-25 VITALS — BODY MASS INDEX: 34.72 KG/M2 | HEIGHT: 66 IN | WEIGHT: 216 LBS

## 2019-02-25 VITALS — DIASTOLIC BLOOD PRESSURE: 70 MMHG | SYSTOLIC BLOOD PRESSURE: 125 MMHG

## 2019-02-25 DIAGNOSIS — B34.9 VIRAL INFECTION, UNSPECIFIED: ICD-10-CM

## 2019-02-25 PROCEDURE — 99213 OFFICE O/P EST LOW 20 MIN: CPT

## 2019-02-25 NOTE — REVIEW OF SYSTEMS
[Fever] : fever [Chills] : chills [Fatigue] : fatigue [Nasal Discharge] : nasal discharge [Negative] : Eyes [Night Sweats] : no night sweats

## 2019-02-25 NOTE — PHYSICAL EXAM
[No Acute Distress] : no acute distress [Well Nourished] : well nourished [Well Developed] : well developed [Well-Appearing] : well-appearing [Normal Sclera/Conjunctiva] : normal sclera/conjunctiva [PERRL] : pupils equal round and reactive to light [EOMI] : extraocular movements intact [No JVD] : no jugular venous distention [Supple] : supple [No Lymphadenopathy] : no lymphadenopathy [Thyroid Normal, No Nodules] : the thyroid was normal and there were no nodules present [No Respiratory Distress] : no respiratory distress  [Clear to Auscultation] : lungs were clear to auscultation bilaterally [No Accessory Muscle Use] : no accessory muscle use [Normal Rate] : normal rate  [Regular Rhythm] : with a regular rhythm [Normal S1, S2] : normal S1 and S2 [No Murmur] : no murmur heard [No Carotid Bruits] : no carotid bruits [No Abdominal Bruit] : a ~M bruit was not heard ~T in the abdomen [No Varicosities] : no varicosities [Pedal Pulses Present] : the pedal pulses are present [No Edema] : there was no peripheral edema [No Extremity Clubbing/Cyanosis] : no extremity clubbing/cyanosis [No Palpable Aorta] : no palpable aorta [Soft] : abdomen soft [Non Tender] : non-tender [Non-distended] : non-distended [No Masses] : no abdominal mass palpated [No HSM] : no HSM [Normal Bowel Sounds] : normal bowel sounds [Normal Posterior Cervical Nodes] : no posterior cervical lymphadenopathy [Normal Anterior Cervical Nodes] : no anterior cervical lymphadenopathy [No CVA Tenderness] : no CVA  tenderness [No Spinal Tenderness] : no spinal tenderness [No Joint Swelling] : no joint swelling [Grossly Normal Strength/Tone] : grossly normal strength/tone [No Rash] : no rash [Normal Gait] : normal gait [Coordination Grossly Intact] : coordination grossly intact [No Focal Deficits] : no focal deficits [Deep Tendon Reflexes (DTR)] : deep tendon reflexes were 2+ and symmetric [Normal Affect] : the affect was normal [Normal Insight/Judgement] : insight and judgment were intact [de-identified] : nasal congestion

## 2019-02-25 NOTE — HISTORY OF PRESENT ILLNESS
[Moderate] : moderate [___ Days ago] : [unfilled] days ago [Paroxysmal] : paroxysmal [Congestion] : congestion [Cough] : cough [Sore Throat] : sore throat [Chills] : chills [NSAIDs] : NSAIDs [Anorexia] : no anorexia [Fatigue] : not fatigue [Headache] : no headache

## 2019-03-13 ENCOUNTER — APPOINTMENT (OUTPATIENT)
Dept: HEART AND VASCULAR | Facility: CLINIC | Age: 44
End: 2019-03-13

## 2019-05-08 ENCOUNTER — APPOINTMENT (OUTPATIENT)
Dept: HEART AND VASCULAR | Facility: CLINIC | Age: 44
End: 2019-05-08
Payer: COMMERCIAL

## 2019-05-08 ENCOUNTER — LABORATORY RESULT (OUTPATIENT)
Age: 44
End: 2019-05-08

## 2019-05-08 VITALS — WEIGHT: 221 LBS | BODY MASS INDEX: 35.52 KG/M2 | HEIGHT: 66 IN

## 2019-05-08 VITALS — DIASTOLIC BLOOD PRESSURE: 80 MMHG | SYSTOLIC BLOOD PRESSURE: 150 MMHG

## 2019-05-08 PROCEDURE — 93000 ELECTROCARDIOGRAM COMPLETE: CPT

## 2019-05-08 PROCEDURE — 93306 TTE W/DOPPLER COMPLETE: CPT

## 2019-05-08 PROCEDURE — 99214 OFFICE O/P EST MOD 30 MIN: CPT

## 2019-05-08 NOTE — HISTORY OF PRESENT ILLNESS
[FreeTextEntry1] : 43-year-old female with a past medical HTN\par was in ER w BP spike to 200/100 w headache \par self increased her meds \par Has moderate stress at work poor eating and 10 lb wgt gain

## 2019-05-08 NOTE — PHYSICAL EXAM
[General Appearance - Well Developed] : well developed [Normal Appearance] : normal appearance [General Appearance - Well Nourished] : well nourished [Well Groomed] : well groomed [Normal Oral Mucosa] : normal oral mucosa [No Deformities] : no deformities [General Appearance - In No Acute Distress] : no acute distress [No Oral Cyanosis] : no oral cyanosis [No Oral Pallor] : no oral pallor [No Jugular Venous Bermudez A Waves] : no jugular venous bermudez A waves [Normal Jugular Venous V Waves Present] : normal jugular venous V waves present [Normal Jugular Venous A Waves Present] : normal jugular venous A waves present [Respiration, Rhythm And Depth] : normal respiratory rhythm and effort [Heart Sounds] : normal S1 and S2 [Exaggerated Use Of Accessory Muscles For Inspiration] : no accessory muscle use [Heart Rate And Rhythm] : heart rate and rhythm were normal [Auscultation Breath Sounds / Voice Sounds] : lungs were clear to auscultation bilaterally [Edema] : no peripheral edema present [Arterial Pulses Normal] : the arterial pulses were normal [Abdomen Tenderness] : non-tender [Abdomen Soft] : soft [Gait - Sufficient For Exercise Testing] : the gait was sufficient for exercise testing [Abdomen Mass (___ Cm)] : no abdominal mass palpated [Abnormal Walk] : normal gait [Petechial Hemorrhages (___cm)] : no petechial hemorrhages [Nail Clubbing] : no clubbing of the fingernails [Cyanosis, Localized] : no localized cyanosis [] : no ischemic changes

## 2019-05-09 LAB
25(OH)D3 SERPL-MCNC: 25.5 NG/ML
ALBUMIN SERPL ELPH-MCNC: 4.4 G/DL
ALP BLD-CCNC: 78 U/L
ALT SERPL-CCNC: 9 U/L
ANION GAP SERPL CALC-SCNC: 12 MMOL/L
APPEARANCE: ABNORMAL
AST SERPL-CCNC: 11 U/L
BASOPHILS # BLD AUTO: 0.05 K/UL
BASOPHILS NFR BLD AUTO: 0.6 %
BILIRUB SERPL-MCNC: <0.2 MG/DL
BILIRUBIN URINE: NEGATIVE
BLOOD URINE: NEGATIVE
BUN SERPL-MCNC: 12 MG/DL
CALCIUM SERPL-MCNC: 9.5 MG/DL
CHLORIDE SERPL-SCNC: 102 MMOL/L
CHOLEST SERPL-MCNC: 155 MG/DL
CHOLEST/HDLC SERPL: 2.8 RATIO
CO2 SERPL-SCNC: 26 MMOL/L
COLOR: YELLOW
CREAT SERPL-MCNC: 0.88 MG/DL
EOSINOPHIL # BLD AUTO: 0.15 K/UL
EOSINOPHIL NFR BLD AUTO: 1.9 %
ESTIMATED AVERAGE GLUCOSE: 120 MG/DL
FOLATE SERPL-MCNC: >20 NG/ML
GLUCOSE QUALITATIVE U: NEGATIVE
GLUCOSE SERPL-MCNC: 114 MG/DL
HBA1C MFR BLD HPLC: 5.8 %
HCT VFR BLD CALC: 41 %
HDLC SERPL-MCNC: 56 MG/DL
HGB BLD-MCNC: 12.9 G/DL
IMM GRANULOCYTES NFR BLD AUTO: 0.3 %
KETONES URINE: NEGATIVE
LDLC SERPL CALC-MCNC: 87 MG/DL
LEUKOCYTE ESTERASE URINE: NEGATIVE
LYMPHOCYTES # BLD AUTO: 2.6 K/UL
LYMPHOCYTES NFR BLD AUTO: 32.5 %
MAN DIFF?: NORMAL
MCHC RBC-ENTMCNC: 27.9 PG
MCHC RBC-ENTMCNC: 31.5 GM/DL
MCV RBC AUTO: 88.6 FL
MONOCYTES # BLD AUTO: 0.48 K/UL
MONOCYTES NFR BLD AUTO: 6 %
NEUTROPHILS # BLD AUTO: 4.7 K/UL
NEUTROPHILS NFR BLD AUTO: 58.7 %
NITRITE URINE: NEGATIVE
PH URINE: 6
PLATELET # BLD AUTO: 290 K/UL
POTASSIUM SERPL-SCNC: 4.3 MMOL/L
PROT SERPL-MCNC: 7.1 G/DL
PROTEIN URINE: NORMAL
RBC # BLD: 4.63 M/UL
RBC # FLD: 14.1 %
SODIUM SERPL-SCNC: 140 MMOL/L
SPECIFIC GRAVITY URINE: 1.02
T3FREE SERPL-MCNC: 3.12 PG/ML
T3RU NFR SERPL: 1 TBI
T4 FREE SERPL-MCNC: 1.2 NG/DL
T4 SERPL-MCNC: 7.1 UG/DL
TRIGL SERPL-MCNC: 59 MG/DL
TSH SERPL-ACNC: 0.54 UIU/ML
UROBILINOGEN URINE: NORMAL
VIT B12 SERPL-MCNC: >2000 PG/ML
WBC # FLD AUTO: 8 K/UL

## 2019-05-29 ENCOUNTER — APPOINTMENT (OUTPATIENT)
Dept: HEART AND VASCULAR | Facility: CLINIC | Age: 44
End: 2019-05-29
Payer: COMMERCIAL

## 2019-05-29 VITALS — HEIGHT: 66 IN | BODY MASS INDEX: 35.36 KG/M2 | WEIGHT: 220 LBS

## 2019-05-29 VITALS — SYSTOLIC BLOOD PRESSURE: 140 MMHG | DIASTOLIC BLOOD PRESSURE: 80 MMHG

## 2019-05-29 PROCEDURE — 99214 OFFICE O/P EST MOD 30 MIN: CPT

## 2019-05-29 NOTE — PHYSICAL EXAM
[General Appearance - Well Developed] : well developed [Normal Appearance] : normal appearance [General Appearance - Well Nourished] : well nourished [Well Groomed] : well groomed [No Deformities] : no deformities [Normal Oral Mucosa] : normal oral mucosa [General Appearance - In No Acute Distress] : no acute distress [No Oral Pallor] : no oral pallor [No Oral Cyanosis] : no oral cyanosis [Normal Jugular Venous A Waves Present] : normal jugular venous A waves present [Normal Jugular Venous V Waves Present] : normal jugular venous V waves present [No Jugular Venous Bermudez A Waves] : no jugular venous bermudez A waves [Respiration, Rhythm And Depth] : normal respiratory rhythm and effort [Exaggerated Use Of Accessory Muscles For Inspiration] : no accessory muscle use [Auscultation Breath Sounds / Voice Sounds] : lungs were clear to auscultation bilaterally [Heart Rate And Rhythm] : heart rate and rhythm were normal [Heart Sounds] : normal S1 and S2 [Arterial Pulses Normal] : the arterial pulses were normal [Abdomen Soft] : soft [Edema] : no peripheral edema present [Abdomen Tenderness] : non-tender [Abdomen Mass (___ Cm)] : no abdominal mass palpated [Gait - Sufficient For Exercise Testing] : the gait was sufficient for exercise testing [Abnormal Walk] : normal gait [Nail Clubbing] : no clubbing of the fingernails [Cyanosis, Localized] : no localized cyanosis [Petechial Hemorrhages (___cm)] : no petechial hemorrhages [] : no ischemic changes

## 2019-07-29 ENCOUNTER — APPOINTMENT (OUTPATIENT)
Dept: HEART AND VASCULAR | Facility: CLINIC | Age: 44
End: 2019-07-29
Payer: COMMERCIAL

## 2019-07-29 VITALS
HEIGHT: 66 IN | BODY MASS INDEX: 35.36 KG/M2 | SYSTOLIC BLOOD PRESSURE: 134 MMHG | DIASTOLIC BLOOD PRESSURE: 86 MMHG | RESPIRATION RATE: 12 BRPM | WEIGHT: 220 LBS | HEART RATE: 60 BPM

## 2019-07-29 PROCEDURE — 99214 OFFICE O/P EST MOD 30 MIN: CPT

## 2019-07-29 NOTE — REASON FOR VISIT
[Follow-Up - Clinic] : a clinic follow-up of [Hyperlipidemia] : hyperlipidemia [Fatigue] : feeling tired (fatigue) [Hypertension] : hypertension

## 2019-07-29 NOTE — HISTORY OF PRESENT ILLNESS
[FreeTextEntry1] : 44-year-old female with a past medical history of hypertension hyperlipidemia hypothyroidism currently off Synthroid unknown reason why. Patient comes in for fatigue. Most notably over the last 3 days despite adequate amount of sleep and rest. This morning feeling whole body aches no fever nausea vomiting diarrhea. No recent travel outside [place] travel to [place].

## 2019-07-29 NOTE — DISCUSSION/SUMMARY
[FreeTextEntry1] : 1. Fatigue: Etiology unclear Will obtain blood work including Lyme disease and thyroid function testing to rule out potential cause. Advised to rest plenty of electrolyte-based drinks and to call or return if symptoms worsen.\par 2. Hypothyroidism: Currently off Synthroid will advise once results of TFTs known.\par 3. Diabetes: Last hemoglobin A1c May 2019 5.8 advised to continue metformin

## 2019-07-29 NOTE — PHYSICAL EXAM
[Well Groomed] : well groomed [Normal Appearance] : normal appearance [General Appearance - Well Developed] : well developed [No Deformities] : no deformities [General Appearance - Well Nourished] : well nourished [General Appearance - In No Acute Distress] : no acute distress [No Oral Pallor] : no oral pallor [Normal Oral Mucosa] : normal oral mucosa [No Oral Cyanosis] : no oral cyanosis [Normal Jugular Venous V Waves Present] : normal jugular venous V waves present [Normal Jugular Venous A Waves Present] : normal jugular venous A waves present [No Jugular Venous Bermudez A Waves] : no jugular venous bermudez A waves [Heart Rate And Rhythm] : heart rate and rhythm were normal [Heart Sounds] : normal S1 and S2 [Arterial Pulses Normal] : the arterial pulses were normal [Edema] : no peripheral edema present [Respiration, Rhythm And Depth] : normal respiratory rhythm and effort [Auscultation Breath Sounds / Voice Sounds] : lungs were clear to auscultation bilaterally [Exaggerated Use Of Accessory Muscles For Inspiration] : no accessory muscle use [Abdomen Soft] : soft [Abdomen Tenderness] : non-tender [Abdomen Mass (___ Cm)] : no abdominal mass palpated [Abnormal Walk] : normal gait [Gait - Sufficient For Exercise Testing] : the gait was sufficient for exercise testing [Nail Clubbing] : no clubbing of the fingernails [Cyanosis, Localized] : no localized cyanosis [] : no ischemic changes [Petechial Hemorrhages (___cm)] : no petechial hemorrhages [Oriented To Time, Place, And Person] : oriented to person, place, and time [Affect] : the affect was normal [No Anxiety] : not feeling anxious [Mood] : the mood was normal

## 2019-07-30 LAB
ALBUMIN SERPL ELPH-MCNC: 4.2 G/DL
ALP BLD-CCNC: 76 U/L
ALT SERPL-CCNC: <5 U/L
ANION GAP SERPL CALC-SCNC: 14 MMOL/L
AST SERPL-CCNC: 7 U/L
BASOPHILS # BLD AUTO: 0.04 K/UL
BASOPHILS NFR BLD AUTO: 0.5 %
BILIRUB SERPL-MCNC: 0.2 MG/DL
BUN SERPL-MCNC: 13 MG/DL
CALCIUM SERPL-MCNC: 9.6 MG/DL
CHLORIDE SERPL-SCNC: 103 MMOL/L
CO2 SERPL-SCNC: 24 MMOL/L
CREAT SERPL-MCNC: 0.9 MG/DL
EOSINOPHIL # BLD AUTO: 0.13 K/UL
EOSINOPHIL NFR BLD AUTO: 1.7 %
GLUCOSE SERPL-MCNC: 84 MG/DL
HCT VFR BLD CALC: 40.8 %
HGB BLD-MCNC: 12.6 G/DL
IMM GRANULOCYTES NFR BLD AUTO: 0.3 %
LYMPHOCYTES # BLD AUTO: 2.34 K/UL
LYMPHOCYTES NFR BLD AUTO: 30.8 %
MAN DIFF?: NORMAL
MCHC RBC-ENTMCNC: 27.8 PG
MCHC RBC-ENTMCNC: 30.9 GM/DL
MCV RBC AUTO: 90.1 FL
MONOCYTES # BLD AUTO: 0.52 K/UL
MONOCYTES NFR BLD AUTO: 6.9 %
NEUTROPHILS # BLD AUTO: 4.54 K/UL
NEUTROPHILS NFR BLD AUTO: 59.8 %
PLATELET # BLD AUTO: 266 K/UL
POTASSIUM SERPL-SCNC: 4.3 MMOL/L
PROT SERPL-MCNC: 6.9 G/DL
RBC # BLD: 4.53 M/UL
RBC # FLD: 14.2 %
SODIUM SERPL-SCNC: 141 MMOL/L
T3FREE SERPL-MCNC: 2.88 PG/ML
T4 FREE SERPL-MCNC: 1.1 NG/DL
T4 SERPL-MCNC: 6.6 UG/DL
TSH SERPL-ACNC: 0.56 UIU/ML
WBC # FLD AUTO: 7.59 K/UL

## 2019-08-07 LAB

## 2019-08-14 ENCOUNTER — APPOINTMENT (OUTPATIENT)
Dept: HEART AND VASCULAR | Facility: CLINIC | Age: 44
End: 2019-08-14
Payer: COMMERCIAL

## 2019-08-14 VITALS — HEIGHT: 66 IN | WEIGHT: 216 LBS | BODY MASS INDEX: 34.72 KG/M2

## 2019-08-14 VITALS — DIASTOLIC BLOOD PRESSURE: 70 MMHG | SYSTOLIC BLOOD PRESSURE: 125 MMHG

## 2019-08-14 PROCEDURE — 99214 OFFICE O/P EST MOD 30 MIN: CPT

## 2019-08-14 NOTE — PHYSICAL EXAM
[No Acute Distress] : no acute distress [Well Nourished] : well nourished [Well Developed] : well developed [Well-Appearing] : well-appearing [Normal Sclera/Conjunctiva] : normal sclera/conjunctiva [PERRL] : pupils equal round and reactive to light [EOMI] : extraocular movements intact [No JVD] : no jugular venous distention [Supple] : supple [No Lymphadenopathy] : no lymphadenopathy [Thyroid Normal, No Nodules] : the thyroid was normal and there were no nodules present [No Respiratory Distress] : no respiratory distress  [No Accessory Muscle Use] : no accessory muscle use [Clear to Auscultation] : lungs were clear to auscultation bilaterally [Normal Rate] : normal rate  [Normal S1, S2] : normal S1 and S2 [Regular Rhythm] : with a regular rhythm [No Murmur] : no murmur heard [No Carotid Bruits] : no carotid bruits [No Abdominal Bruit] : a ~M bruit was not heard ~T in the abdomen [No Varicosities] : no varicosities [Pedal Pulses Present] : the pedal pulses are present [No Edema] : there was no peripheral edema [No Palpable Aorta] : no palpable aorta [No Extremity Clubbing/Cyanosis] : no extremity clubbing/cyanosis [Non Tender] : non-tender [Soft] : abdomen soft [Non-distended] : non-distended [No Masses] : no abdominal mass palpated [No HSM] : no HSM [Normal Bowel Sounds] : normal bowel sounds [Normal Posterior Cervical Nodes] : no posterior cervical lymphadenopathy [Normal Anterior Cervical Nodes] : no anterior cervical lymphadenopathy [No CVA Tenderness] : no CVA  tenderness [No Joint Swelling] : no joint swelling [No Spinal Tenderness] : no spinal tenderness [Grossly Normal Strength/Tone] : grossly normal strength/tone [No Rash] : no rash [Coordination Grossly Intact] : coordination grossly intact [No Focal Deficits] : no focal deficits [Normal Gait] : normal gait [Deep Tendon Reflexes (DTR)] : deep tendon reflexes were 2+ and symmetric [Normal Affect] : the affect was normal [Normal Insight/Judgement] : insight and judgment were intact [de-identified] : exudate right rehan pahrynx

## 2019-08-14 NOTE — HISTORY OF PRESENT ILLNESS
[Moderate] : moderate [___ Days ago] : [unfilled] days ago [Constant] : constant [Sore Throat] : sore throat [Cough] : cough [Chills] : chills [Fatigue] : fatigue [Headache] : headache

## 2019-08-15 LAB
BASOPHILS # BLD AUTO: 0.06 K/UL
BASOPHILS NFR BLD AUTO: 0.7 %
EOSINOPHIL # BLD AUTO: 0.08 K/UL
EOSINOPHIL NFR BLD AUTO: 1 %
HCT VFR BLD CALC: 41.4 %
HETEROPH AB SER QL: NEGATIVE
HGB BLD-MCNC: 13.1 G/DL
IMM GRANULOCYTES NFR BLD AUTO: 0.2 %
LYMPHOCYTES # BLD AUTO: 1.92 K/UL
LYMPHOCYTES NFR BLD AUTO: 23.9 %
MAN DIFF?: NORMAL
MCHC RBC-ENTMCNC: 27.5 PG
MCHC RBC-ENTMCNC: 31.6 GM/DL
MCV RBC AUTO: 87 FL
MONOCYTES # BLD AUTO: 0.49 K/UL
MONOCYTES NFR BLD AUTO: 6.1 %
NEUTROPHILS # BLD AUTO: 5.48 K/UL
NEUTROPHILS NFR BLD AUTO: 68.1 %
PLATELET # BLD AUTO: 292 K/UL
RBC # BLD: 4.76 M/UL
RBC # FLD: 14 %
WBC # FLD AUTO: 8.05 K/UL

## 2019-08-19 ENCOUNTER — MOBILE ON CALL (OUTPATIENT)
Age: 44
End: 2019-08-19

## 2019-08-19 LAB — BACTERIA THROAT CULT: NORMAL

## 2019-10-18 ENCOUNTER — APPOINTMENT (OUTPATIENT)
Dept: HEART AND VASCULAR | Facility: CLINIC | Age: 44
End: 2019-10-18
Payer: COMMERCIAL

## 2019-10-18 VITALS — WEIGHT: 201 LBS | HEIGHT: 66 IN | BODY MASS INDEX: 32.3 KG/M2

## 2019-10-18 PROCEDURE — 99213 OFFICE O/P EST LOW 20 MIN: CPT

## 2019-10-22 ENCOUNTER — RX CHANGE (OUTPATIENT)
Age: 44
End: 2019-10-22

## 2019-10-25 VITALS — SYSTOLIC BLOOD PRESSURE: 158 MMHG | DIASTOLIC BLOOD PRESSURE: 100 MMHG | HEART RATE: 64 BPM | HEIGHT: 66 IN

## 2019-10-25 NOTE — HISTORY OF PRESENT ILLNESS
[FreeTextEntry1] : Pt c/o sinus pressure, HA, nasal discharge, sore throat.  [de-identified] : Pt denies c/o wheezing, fever, hemoptysis, night sweats. \par Pt has been skipping her HTN medications. \par Pt has been maintaining healthy diet and exercising for weight loss.

## 2019-10-25 NOTE — PLAN
[FreeTextEntry1] : URI: Augmentin 875/125 as prescribed\par HHTN: Pt is encouraged medication compliance; risks and benefits discussed. \par Increase fluid intake, rest, OTC medications PRN.\par Pt is encouraged to call or come back to the office if symptoms worsen or do not improve in 1-2 days - pt verbalized good understanding.

## 2019-11-20 ENCOUNTER — APPOINTMENT (OUTPATIENT)
Dept: HEART AND VASCULAR | Facility: CLINIC | Age: 44
End: 2019-11-20

## 2019-11-21 ENCOUNTER — APPOINTMENT (OUTPATIENT)
Dept: HEART AND VASCULAR | Facility: CLINIC | Age: 44
End: 2019-11-21
Payer: COMMERCIAL

## 2019-11-21 VITALS
HEIGHT: 66 IN | SYSTOLIC BLOOD PRESSURE: 144 MMHG | DIASTOLIC BLOOD PRESSURE: 80 MMHG | BODY MASS INDEX: 32.14 KG/M2 | HEART RATE: 80 BPM | WEIGHT: 200 LBS | RESPIRATION RATE: 12 BRPM

## 2019-11-21 PROCEDURE — 99214 OFFICE O/P EST MOD 30 MIN: CPT

## 2019-11-21 RX ORDER — AMOXICILLIN AND CLAVULANATE POTASSIUM 875; 125 MG/1; MG/1
875-125 TABLET, COATED ORAL
Qty: 20 | Refills: 0 | Status: DISCONTINUED | COMMUNITY
Start: 2019-10-18 | End: 2019-11-21

## 2019-11-21 RX ORDER — AZITHROMYCIN 250 MG/1
250 TABLET, FILM COATED ORAL
Qty: 1 | Refills: 3 | Status: DISCONTINUED | COMMUNITY
Start: 2019-08-14 | End: 2019-11-21

## 2019-11-21 NOTE — PLAN
[FreeTextEntry1] : 1. Diarrhea: Likely enteritis viral, advised to maintain a Francoise diet and drink plenty of electrolyte-based drinks rest. If symptoms persist beyond one to 2 days advised to return and will need to send off a stool culture to rule out C. difficile. Advised probiotics.\par 2. Diabetes: Continue oral hypoglycemics\par 3. Hypothyroidism: Continue Synthroid\par 4. Hypertension: Continue lisinopril amlodipine

## 2019-11-21 NOTE — HISTORY OF PRESENT ILLNESS
[FreeTextEntry8] : 44-year-old female with past medical history of hypertension diabetes hypothyroidism comes in for evaluation of abdominal pain and diarrhea. Patient reports 2 days of watery diarrhea and diffuse abdominal pain no nausea no vomiting. Patient completed a course of antibiotics 3 weeks ago. Patient denies any fever.

## 2019-11-21 NOTE — PHYSICAL EXAM
[Soft] : abdomen soft [Non-distended] : non-distended [Normal Bowel Sounds] : normal bowel sounds [Normal] : affect was normal and insight and judgment were intact [de-identified] : diffuse mild tenderness to palpation

## 2019-11-22 LAB
ALBUMIN SERPL ELPH-MCNC: 4.3 G/DL
ALP BLD-CCNC: 76 U/L
ALT SERPL-CCNC: 6 U/L
AMYLASE/CREAT SERPL: 41 U/L
ANION GAP SERPL CALC-SCNC: 13 MMOL/L
AST SERPL-CCNC: 12 U/L
BASOPHILS # BLD AUTO: 0.04 K/UL
BASOPHILS NFR BLD AUTO: 0.7 %
BILIRUB SERPL-MCNC: 0.4 MG/DL
BUN SERPL-MCNC: 15 MG/DL
CALCIUM SERPL-MCNC: 9.6 MG/DL
CHLORIDE SERPL-SCNC: 103 MMOL/L
CO2 SERPL-SCNC: 26 MMOL/L
CREAT SERPL-MCNC: 1.08 MG/DL
EOSINOPHIL # BLD AUTO: 0.13 K/UL
EOSINOPHIL NFR BLD AUTO: 2.2 %
GLUCOSE SERPL-MCNC: 80 MG/DL
HCT VFR BLD CALC: 41.4 %
HGB BLD-MCNC: 13.1 G/DL
IMM GRANULOCYTES NFR BLD AUTO: 0.2 %
LPL SERPL-CCNC: 18 U/L
LYMPHOCYTES # BLD AUTO: 2.1 K/UL
LYMPHOCYTES NFR BLD AUTO: 35.8 %
MAN DIFF?: NORMAL
MCHC RBC-ENTMCNC: 28.5 PG
MCHC RBC-ENTMCNC: 31.6 GM/DL
MCV RBC AUTO: 90.2 FL
MONOCYTES # BLD AUTO: 0.39 K/UL
MONOCYTES NFR BLD AUTO: 6.7 %
NEUTROPHILS # BLD AUTO: 3.19 K/UL
NEUTROPHILS NFR BLD AUTO: 54.4 %
PLATELET # BLD AUTO: 245 K/UL
POTASSIUM SERPL-SCNC: 4.7 MMOL/L
PROT SERPL-MCNC: 6.9 G/DL
RBC # BLD: 4.59 M/UL
RBC # FLD: 13.8 %
SODIUM SERPL-SCNC: 142 MMOL/L
WBC # FLD AUTO: 5.86 K/UL

## 2019-12-02 ENCOUNTER — RX RENEWAL (OUTPATIENT)
Age: 44
End: 2019-12-02

## 2020-01-16 ENCOUNTER — APPOINTMENT (OUTPATIENT)
Dept: HEART AND VASCULAR | Facility: CLINIC | Age: 45
End: 2020-01-16
Payer: COMMERCIAL

## 2020-01-16 VITALS
SYSTOLIC BLOOD PRESSURE: 150 MMHG | WEIGHT: 199 LBS | HEIGHT: 66 IN | BODY MASS INDEX: 31.98 KG/M2 | DIASTOLIC BLOOD PRESSURE: 95 MMHG

## 2020-01-16 DIAGNOSIS — Z87.09 PERSONAL HISTORY OF OTHER DISEASES OF THE RESPIRATORY SYSTEM: ICD-10-CM

## 2020-01-16 PROCEDURE — 99214 OFFICE O/P EST MOD 30 MIN: CPT

## 2020-01-16 NOTE — PHYSICAL EXAM
[No Acute Distress] : no acute distress [Well-Appearing] : well-appearing [Well Developed] : well developed [Well Nourished] : well nourished [PERRL] : pupils equal round and reactive to light [EOMI] : extraocular movements intact [Normal Sclera/Conjunctiva] : normal sclera/conjunctiva [Thyroid Normal, No Nodules] : the thyroid was normal and there were no nodules present [Supple] : supple [No Accessory Muscle Use] : no accessory muscle use [No Respiratory Distress] : no respiratory distress  [Clear to Auscultation] : lungs were clear to auscultation bilaterally [Regular Rhythm] : with a regular rhythm [Normal Rate] : normal rate  [Normal S1, S2] : normal S1 and S2 [No Abdominal Bruit] : a ~M bruit was not heard ~T in the abdomen [No Carotid Bruits] : no carotid bruits [No Murmur] : no murmur heard [Pedal Pulses Present] : the pedal pulses are present [No Edema] : there was no peripheral edema [No Varicosities] : no varicosities [No Palpable Aorta] : no palpable aorta [Soft] : abdomen soft [No Extremity Clubbing/Cyanosis] : no extremity clubbing/cyanosis [Non-distended] : non-distended [Non Tender] : non-tender [No HSM] : no HSM [No Masses] : no abdominal mass palpated [Normal Bowel Sounds] : normal bowel sounds [No CVA Tenderness] : no CVA  tenderness [Normal Posterior Cervical Nodes] : no posterior cervical lymphadenopathy [Normal Anterior Cervical Nodes] : no anterior cervical lymphadenopathy [No Joint Swelling] : no joint swelling [No Spinal Tenderness] : no spinal tenderness [Coordination Grossly Intact] : coordination grossly intact [Grossly Normal Strength/Tone] : grossly normal strength/tone [No Rash] : no rash [Deep Tendon Reflexes (DTR)] : deep tendon reflexes were 2+ and symmetric [Normal Gait] : normal gait [No Focal Deficits] : no focal deficits [Normal Affect] : the affect was normal [Normal Insight/Judgement] : insight and judgment were intact [de-identified] : swollen turbinates R>L minor exudate right tonsil [de-identified] : MILD LYMPH NODES

## 2020-01-16 NOTE — HISTORY OF PRESENT ILLNESS
[Cough] : cough [___ Days ago] : [unfilled] days ago [Cold Symptoms] : cold symptoms [Moderate] : moderate [Congestion] : congestion [Constant] : constant [Headache] : headache [OTC Remedies] : OTC remedies [At Night] : at night [Stable] : stable [Sore Throat] : no sore throat [Chills] : no chills [Wheezing] : no wheezing [Earache] : no earache [Anorexia] : no anorexia [Shortness Of Breath] : no shortness of breath [Fever] : no fever

## 2020-01-20 LAB — BACTERIA THROAT CULT: NORMAL

## 2020-02-03 ENCOUNTER — RX CHANGE (OUTPATIENT)
Age: 45
End: 2020-02-03

## 2020-02-26 ENCOUNTER — APPOINTMENT (OUTPATIENT)
Dept: HEART AND VASCULAR | Facility: CLINIC | Age: 45
End: 2020-02-26

## 2020-04-26 ENCOUNTER — RX RENEWAL (OUTPATIENT)
Age: 45
End: 2020-04-26

## 2020-06-10 ENCOUNTER — NON-APPOINTMENT (OUTPATIENT)
Age: 45
End: 2020-06-10

## 2020-06-10 ENCOUNTER — APPOINTMENT (OUTPATIENT)
Dept: HEART AND VASCULAR | Facility: CLINIC | Age: 45
End: 2020-06-10
Payer: COMMERCIAL

## 2020-06-10 VITALS
BODY MASS INDEX: 33.27 KG/M2 | SYSTOLIC BLOOD PRESSURE: 160 MMHG | DIASTOLIC BLOOD PRESSURE: 100 MMHG | WEIGHT: 207 LBS | HEIGHT: 66 IN

## 2020-06-10 PROCEDURE — 93000 ELECTROCARDIOGRAM COMPLETE: CPT

## 2020-06-10 PROCEDURE — 99214 OFFICE O/P EST MOD 30 MIN: CPT

## 2020-06-10 PROCEDURE — 36415 COLL VENOUS BLD VENIPUNCTURE: CPT

## 2020-06-10 RX ORDER — HYDROCHLOROTHIAZIDE 12.5 MG/1
12.5 CAPSULE ORAL DAILY
Qty: 90 | Refills: 3 | Status: DISCONTINUED | COMMUNITY
Start: 2020-03-18 | End: 2020-06-10

## 2020-06-10 NOTE — ASSESSMENT
[FreeTextEntry1] : Assessment \par Accelrated HTN dspite meds \par has polyuria from HCTZ \par d/c hctz and add labetalol 200 mg bid

## 2020-06-10 NOTE — PHYSICAL EXAM
[General Appearance - Well Developed] : well developed [Normal Appearance] : normal appearance [Well Groomed] : well groomed [General Appearance - Well Nourished] : well nourished [No Deformities] : no deformities [General Appearance - In No Acute Distress] : no acute distress [Eyelids - No Xanthelasma] : the eyelids demonstrated no xanthelasmas [Normal Conjunctiva] : the conjunctiva exhibited no abnormalities [Normal Oral Mucosa] : normal oral mucosa [No Oral Pallor] : no oral pallor [No Oral Cyanosis] : no oral cyanosis [Normal Jugular Venous A Waves Present] : normal jugular venous A waves present [Normal Jugular Venous V Waves Present] : normal jugular venous V waves present [No Jugular Venous Bermudez A Waves] : no jugular venous bermudez A waves [Respiration, Rhythm And Depth] : normal respiratory rhythm and effort [Exaggerated Use Of Accessory Muscles For Inspiration] : no accessory muscle use [Auscultation Breath Sounds / Voice Sounds] : lungs were clear to auscultation bilaterally [Heart Rate And Rhythm] : heart rate and rhythm were normal [Heart Sounds] : normal S1 and S2 [Murmurs] : no murmurs present [Abdomen Soft] : soft [Abdomen Tenderness] : non-tender [] : no hepato-splenomegaly [Abdomen Mass (___ Cm)] : no abdominal mass palpated [Abnormal Walk] : normal gait [Gait - Sufficient For Exercise Testing] : the gait was sufficient for exercise testing

## 2020-06-11 LAB
ALBUMIN SERPL ELPH-MCNC: 4.6 G/DL
ALP BLD-CCNC: 68 U/L
ALT SERPL-CCNC: 8 U/L
ANION GAP SERPL CALC-SCNC: 14 MMOL/L
AST SERPL-CCNC: 15 U/L
BASOPHILS # BLD AUTO: 0.05 K/UL
BASOPHILS NFR BLD AUTO: 0.6 %
BILIRUB SERPL-MCNC: 0.2 MG/DL
BUN SERPL-MCNC: 10 MG/DL
CALCIUM SERPL-MCNC: 9.2 MG/DL
CHLORIDE SERPL-SCNC: 100 MMOL/L
CHOLEST SERPL-MCNC: 156 MG/DL
CHOLEST/HDLC SERPL: 2.3 RATIO
CO2 SERPL-SCNC: 25 MMOL/L
CREAT SERPL-MCNC: 1 MG/DL
EOSINOPHIL # BLD AUTO: 0.08 K/UL
EOSINOPHIL NFR BLD AUTO: 1 %
ESTIMATED AVERAGE GLUCOSE: 108 MG/DL
FOLATE SERPL-MCNC: >20 NG/ML
GLUCOSE SERPL-MCNC: 105 MG/DL
HBA1C MFR BLD HPLC: 5.4 %
HCT VFR BLD CALC: 42.2 %
HDLC SERPL-MCNC: 69 MG/DL
HGB BLD-MCNC: 13.2 G/DL
HIV1+2 AB SPEC QL IA.RAPID: NONREACTIVE
IMM GRANULOCYTES NFR BLD AUTO: 0.4 %
LDLC SERPL CALC-MCNC: 77 MG/DL
LYMPHOCYTES # BLD AUTO: 2.54 K/UL
LYMPHOCYTES NFR BLD AUTO: 30.4 %
MAN DIFF?: NORMAL
MCHC RBC-ENTMCNC: 28 PG
MCHC RBC-ENTMCNC: 31.3 GM/DL
MCV RBC AUTO: 89.4 FL
MONOCYTES # BLD AUTO: 0.65 K/UL
MONOCYTES NFR BLD AUTO: 7.8 %
NEUTROPHILS # BLD AUTO: 5 K/UL
NEUTROPHILS NFR BLD AUTO: 59.8 %
PLATELET # BLD AUTO: 268 K/UL
POTASSIUM SERPL-SCNC: 3.6 MMOL/L
PROT SERPL-MCNC: 7.1 G/DL
RBC # BLD: 4.72 M/UL
RBC # FLD: 13.4 %
SODIUM SERPL-SCNC: 140 MMOL/L
T3FREE SERPL-MCNC: 2.67 PG/ML
T4 FREE SERPL-MCNC: 1.2 NG/DL
T4 SERPL-MCNC: 7.1 UG/DL
TRIGL SERPL-MCNC: 49 MG/DL
TSH SERPL-ACNC: 0.63 UIU/ML
VIT B12 SERPL-MCNC: 918 PG/ML
WBC # FLD AUTO: 8.35 K/UL

## 2020-06-13 LAB
RPR SER-TITR: NORMAL
SARS-COV-2 IGG SERPL IA-ACNC: <0.1 INDEX
SARS-COV-2 IGG SERPL QL IA: NEGATIVE

## 2020-06-24 ENCOUNTER — APPOINTMENT (OUTPATIENT)
Dept: HEART AND VASCULAR | Facility: CLINIC | Age: 45
End: 2020-06-24
Payer: COMMERCIAL

## 2020-06-24 VITALS
SYSTOLIC BLOOD PRESSURE: 135 MMHG | BODY MASS INDEX: 33.27 KG/M2 | HEIGHT: 66 IN | DIASTOLIC BLOOD PRESSURE: 80 MMHG | WEIGHT: 207 LBS

## 2020-06-24 PROCEDURE — 99214 OFFICE O/P EST MOD 30 MIN: CPT

## 2020-06-24 RX ORDER — AZITHROMYCIN 250 MG/1
250 TABLET, FILM COATED ORAL
Qty: 1 | Refills: 0 | Status: DISCONTINUED | COMMUNITY
Start: 2020-01-16 | End: 2020-06-24

## 2020-06-24 NOTE — PHYSICAL EXAM
[General Appearance - Well Developed] : well developed [General Appearance - Well Nourished] : well nourished [Normal Appearance] : normal appearance [Well Groomed] : well groomed [General Appearance - In No Acute Distress] : no acute distress [Normal Conjunctiva] : the conjunctiva exhibited no abnormalities [No Deformities] : no deformities [Normal Oral Mucosa] : normal oral mucosa [No Oral Pallor] : no oral pallor [Eyelids - No Xanthelasma] : the eyelids demonstrated no xanthelasmas [No Oral Cyanosis] : no oral cyanosis [Normal Jugular Venous V Waves Present] : normal jugular venous V waves present [Normal Jugular Venous A Waves Present] : normal jugular venous A waves present [No Jugular Venous Bermudez A Waves] : no jugular venous bermudez A waves [Respiration, Rhythm And Depth] : normal respiratory rhythm and effort [Auscultation Breath Sounds / Voice Sounds] : lungs were clear to auscultation bilaterally [Exaggerated Use Of Accessory Muscles For Inspiration] : no accessory muscle use [Murmurs] : no murmurs present [Heart Sounds] : normal S1 and S2 [Heart Rate And Rhythm] : heart rate and rhythm were normal [] : no hepato-splenomegaly [Abdomen Tenderness] : non-tender [Abdomen Soft] : soft [Gait - Sufficient For Exercise Testing] : the gait was sufficient for exercise testing [Abnormal Walk] : normal gait [Abdomen Mass (___ Cm)] : no abdominal mass palpated

## 2020-08-19 ENCOUNTER — RX CHANGE (OUTPATIENT)
Age: 45
End: 2020-08-19

## 2020-09-03 ENCOUNTER — APPOINTMENT (OUTPATIENT)
Dept: HEART AND VASCULAR | Facility: CLINIC | Age: 45
End: 2020-09-03
Payer: COMMERCIAL

## 2020-09-03 VITALS
BODY MASS INDEX: 34.39 KG/M2 | HEART RATE: 66 BPM | WEIGHT: 214 LBS | SYSTOLIC BLOOD PRESSURE: 146 MMHG | HEIGHT: 66 IN | DIASTOLIC BLOOD PRESSURE: 102 MMHG

## 2020-09-03 PROCEDURE — 99214 OFFICE O/P EST MOD 30 MIN: CPT

## 2020-09-03 RX ORDER — LEVOTHYROXINE SODIUM 0.05 MG/1
50 TABLET ORAL
Qty: 30 | Refills: 3 | Status: DISCONTINUED | COMMUNITY
Start: 2018-10-03 | End: 2020-09-03

## 2020-09-03 RX ORDER — METFORMIN HYDROCHLORIDE 500 MG/1
500 TABLET, FILM COATED ORAL
Refills: 0 | Status: DISCONTINUED | COMMUNITY
End: 2020-09-03

## 2020-09-03 NOTE — HISTORY OF PRESENT ILLNESS
[FreeTextEntry1] : Pt reports occasional fatigue and noted her SBP in the 90's; she was recently started on new BP medication regimen. Pt states she has been exercising more, improved hydration, and keeping low fat/low sodium diet since the weight gain of 11lbs during quarantine. \par Pt denies CP, chest burning sensation, palpitations, blurry vision, nausea, vomiting, fatigue, diaphoresis, syncopal or near syncopal episodes. \par

## 2020-09-03 NOTE — PHYSICAL EXAM
[General Appearance - Well Developed] : well developed [Normal Appearance] : normal appearance [Well Groomed] : well groomed [General Appearance - Well Nourished] : well nourished [No Deformities] : no deformities [General Appearance - In No Acute Distress] : no acute distress [Normal Oral Mucosa] : normal oral mucosa [No Oral Pallor] : no oral pallor [No Oral Cyanosis] : no oral cyanosis [Respiration, Rhythm And Depth] : normal respiratory rhythm and effort [Exaggerated Use Of Accessory Muscles For Inspiration] : no accessory muscle use [Auscultation Breath Sounds / Voice Sounds] : lungs were clear to auscultation bilaterally [Heart Rate And Rhythm] : heart rate and rhythm were normal [Heart Sounds] : normal S1 and S2 [Murmurs] : no murmurs present [Gait - Sufficient For Exercise Testing] : the gait was sufficient for exercise testing [Abnormal Walk] : normal gait [Nail Clubbing] : no clubbing of the fingernails [Cyanosis, Localized] : no localized cyanosis [] : no ischemic changes [Petechial Hemorrhages (___cm)] : no petechial hemorrhages

## 2020-09-03 NOTE — DISCUSSION/SUMMARY
[FreeTextEntry1] : HTN: Pt is recommended to space out her BP medications instead of taking them all at once. Pt will start taking Amlodipine in the late afternoon. \par DM: Appears to be well controlled with current diet; pt stopped taking Metformin about 1 year ago. \par Pt will continue low fat/low sodium diet and moderate intensity exercise. Weight loss discussed and encouraged. \par Pt is recommended to obtain BP monitor and monitor BP in the am and once during the day; if hypotension is noted pt will call or come in for further evaluation and adjustment of medication regimen.

## 2020-09-11 RX ORDER — ZAFIRLUKAST 10 MG/1
10 TABLET, COATED ORAL
Qty: 90 | Refills: 0 | Status: ACTIVE | COMMUNITY
Start: 1900-01-01 | End: 1900-01-01

## 2020-09-16 ENCOUNTER — APPOINTMENT (OUTPATIENT)
Dept: HEART AND VASCULAR | Facility: CLINIC | Age: 45
End: 2020-09-16

## 2020-09-21 ENCOUNTER — RX CHANGE (OUTPATIENT)
Age: 45
End: 2020-09-21

## 2020-09-23 ENCOUNTER — RX CHANGE (OUTPATIENT)
Age: 45
End: 2020-09-23

## 2020-11-18 ENCOUNTER — NON-APPOINTMENT (OUTPATIENT)
Age: 45
End: 2020-11-18

## 2020-11-27 ENCOUNTER — APPOINTMENT (OUTPATIENT)
Dept: HEART AND VASCULAR | Facility: CLINIC | Age: 45
End: 2020-11-27

## 2020-11-30 ENCOUNTER — APPOINTMENT (OUTPATIENT)
Dept: HEART AND VASCULAR | Facility: CLINIC | Age: 45
End: 2020-11-30

## 2020-12-01 ENCOUNTER — NON-APPOINTMENT (OUTPATIENT)
Age: 45
End: 2020-12-01

## 2020-12-23 PROBLEM — Z87.09 HISTORY OF PHARYNGITIS: Status: RESOLVED | Noted: 2019-08-14 | Resolved: 2020-12-23

## 2021-02-16 ENCOUNTER — APPOINTMENT (OUTPATIENT)
Dept: HEART AND VASCULAR | Facility: CLINIC | Age: 46
End: 2021-02-16
Payer: COMMERCIAL

## 2021-02-16 VITALS
HEART RATE: 82 BPM | BODY MASS INDEX: 35.68 KG/M2 | HEIGHT: 66 IN | OXYGEN SATURATION: 99 % | SYSTOLIC BLOOD PRESSURE: 168 MMHG | WEIGHT: 222 LBS | DIASTOLIC BLOOD PRESSURE: 112 MMHG

## 2021-02-16 DIAGNOSIS — Z20.822 CONTACT WITH AND (SUSPECTED) EXPOSURE TO COVID-19: ICD-10-CM

## 2021-02-16 DIAGNOSIS — E11.59 TYPE 2 DIABETES MELLITUS WITH OTHER CIRCULATORY COMPLICATIONS: ICD-10-CM

## 2021-02-16 PROCEDURE — 99072 ADDL SUPL MATRL&STAF TM PHE: CPT

## 2021-02-16 PROCEDURE — 99213 OFFICE O/P EST LOW 20 MIN: CPT

## 2021-02-16 NOTE — HISTORY OF PRESENT ILLNESS
[de-identified] : Pt denies CP, burning sensation, palpitations, blurry vision, nausea, vomiting, diaphoresis, syncopal or near syncopal episodes.\par Pt was exposed to confirmed positive COVID 19 2weeks ago; had symptoms of diarrhea, abdominal cramping, HA; did not test positive on rapid swabs or PCR x 2.\par Endorses fatigue; requiring afternoon naps. \par Reports he has tingling in the lips occasionally.

## 2021-02-16 NOTE — PLAN
[FreeTextEntry1] : HTN: increase Labetalol to 300mg PO BID\par Continue Dicyclomine PRN for abdominal discomfort. \par FU in 2 weeks in this office to evaluate positive effect of new dose of medication. \par Weight gain: discussed importance of better weight management and regular exercise. \par \par \par

## 2021-02-17 LAB
25(OH)D3 SERPL-MCNC: 30.3 NG/ML
ALBUMIN SERPL ELPH-MCNC: 4.4 G/DL
ALP BLD-CCNC: 81 U/L
ALT SERPL-CCNC: 6 U/L
ANION GAP SERPL CALC-SCNC: 14 MMOL/L
AST SERPL-CCNC: 13 U/L
BASOPHILS # BLD AUTO: 0.06 K/UL
BASOPHILS NFR BLD AUTO: 0.7 %
BILIRUB SERPL-MCNC: <0.2 MG/DL
BUN SERPL-MCNC: 13 MG/DL
CALCIUM SERPL-MCNC: 9.9 MG/DL
CHLORIDE SERPL-SCNC: 102 MMOL/L
CHOLEST SERPL-MCNC: 192 MG/DL
CO2 SERPL-SCNC: 22 MMOL/L
CREAT SERPL-MCNC: 1.14 MG/DL
DEPRECATED D DIMER PPP IA-ACNC: 154 NG/ML DDU
EOSINOPHIL # BLD AUTO: 0.15 K/UL
EOSINOPHIL NFR BLD AUTO: 1.8 %
ESTIMATED AVERAGE GLUCOSE: 114 MG/DL
FERRITIN SERPL-MCNC: 53 NG/ML
FOLATE SERPL-MCNC: 12.5 NG/ML
GLUCOSE SERPL-MCNC: 88 MG/DL
HBA1C MFR BLD HPLC: 5.6 %
HCT VFR BLD CALC: 40.9 %
HDLC SERPL-MCNC: 71 MG/DL
HGB BLD-MCNC: 13.1 G/DL
IMM GRANULOCYTES NFR BLD AUTO: 0.2 %
LDLC SERPL CALC-MCNC: 92 MG/DL
LYMPHOCYTES # BLD AUTO: 2.83 K/UL
LYMPHOCYTES NFR BLD AUTO: 33.8 %
Lab: 0.35 INDEX
Lab: NEGATIVE
MAN DIFF?: NORMAL
MCHC RBC-ENTMCNC: 28.1 PG
MCHC RBC-ENTMCNC: 32 GM/DL
MCV RBC AUTO: 87.8 FL
MONOCYTES # BLD AUTO: 0.47 K/UL
MONOCYTES NFR BLD AUTO: 5.6 %
NEUTROPHILS # BLD AUTO: 4.84 K/UL
NEUTROPHILS NFR BLD AUTO: 57.9 %
NONHDLC SERPL-MCNC: 121 MG/DL
PLATELET # BLD AUTO: 276 K/UL
POTASSIUM SERPL-SCNC: 4.4 MMOL/L
PROT SERPL-MCNC: 7 G/DL
RBC # BLD: 4.66 M/UL
RBC # FLD: 14.2 %
SODIUM SERPL-SCNC: 138 MMOL/L
T3 SERPL-MCNC: 99 NG/DL
T4 FREE SERPL-MCNC: 1.3 NG/DL
T4 SERPL-MCNC: 6.8 UG/DL
TRIGL SERPL-MCNC: 146 MG/DL
TSH SERPL-ACNC: 0.67 UIU/ML
VIT B12 SERPL-MCNC: 713 PG/ML
WBC # FLD AUTO: 8.37 K/UL

## 2021-02-18 LAB
SARS-COV-2 IGG SERPL IA-ACNC: 0.44 RATIO
SARS-COV-2 IGG SERPL QL IA: NEGATIVE

## 2021-02-23 LAB — VIT C SERPL-MCNC: 0.9 MG/DL

## 2021-03-02 ENCOUNTER — APPOINTMENT (OUTPATIENT)
Dept: HEART AND VASCULAR | Facility: CLINIC | Age: 46
End: 2021-03-02

## 2021-03-09 ENCOUNTER — NON-APPOINTMENT (OUTPATIENT)
Age: 46
End: 2021-03-09

## 2021-03-09 ENCOUNTER — APPOINTMENT (OUTPATIENT)
Dept: HEART AND VASCULAR | Facility: CLINIC | Age: 46
End: 2021-03-09
Payer: COMMERCIAL

## 2021-03-09 VITALS
DIASTOLIC BLOOD PRESSURE: 90 MMHG | BODY MASS INDEX: 36.32 KG/M2 | HEART RATE: 76 BPM | SYSTOLIC BLOOD PRESSURE: 150 MMHG | WEIGHT: 226 LBS | HEIGHT: 66 IN

## 2021-03-09 VITALS — SYSTOLIC BLOOD PRESSURE: 118 MMHG | DIASTOLIC BLOOD PRESSURE: 80 MMHG

## 2021-03-09 PROCEDURE — 99214 OFFICE O/P EST MOD 30 MIN: CPT

## 2021-03-09 PROCEDURE — 99072 ADDL SUPL MATRL&STAF TM PHE: CPT

## 2021-03-09 NOTE — PHYSICAL EXAM
[General Appearance - Well Developed] : well developed [Normal Appearance] : normal appearance [Well Groomed] : well groomed [General Appearance - Well Nourished] : well nourished [No Deformities] : no deformities [General Appearance - In No Acute Distress] : no acute distress [Normal Conjunctiva] : the conjunctiva exhibited no abnormalities [Eyelids - No Xanthelasma] : the eyelids demonstrated no xanthelasmas [Normal Oral Mucosa] : normal oral mucosa [No Oral Pallor] : no oral pallor [No Oral Cyanosis] : no oral cyanosis [Normal Jugular Venous A Waves Present] : normal jugular venous A waves present [Normal Jugular Venous V Waves Present] : normal jugular venous V waves present [No Jugular Venous Bermudez A Waves] : no jugular venous bermudez A waves [] : no respiratory distress [Respiration, Rhythm And Depth] : normal respiratory rhythm and effort [Exaggerated Use Of Accessory Muscles For Inspiration] : no accessory muscle use [Auscultation Breath Sounds / Voice Sounds] : lungs were clear to auscultation bilaterally [Heart Rate And Rhythm] : heart rate and rhythm were normal [Heart Sounds] : normal S1 and S2 [Murmurs] : no murmurs present

## 2021-03-09 NOTE — HISTORY OF PRESENT ILLNESS
[FreeTextEntry1] : feels weak on higher dose of beta blocker feels very dizzy \par mod wgt gain recent wgt 226 hgt 65 inches BMI 36\par

## 2021-03-09 NOTE — ASSESSMENT
[FreeTextEntry1] : Will lower labetolol to 200 bid \par consider hctz if bp rises \par wgt loss discussed atlength with patient and  will set goal of 1 lb pre week \par

## 2021-04-07 ENCOUNTER — APPOINTMENT (OUTPATIENT)
Dept: HEART AND VASCULAR | Facility: CLINIC | Age: 46
End: 2021-04-07

## 2021-04-12 ENCOUNTER — APPOINTMENT (OUTPATIENT)
Dept: HEART AND VASCULAR | Facility: CLINIC | Age: 46
End: 2021-04-12
Payer: COMMERCIAL

## 2021-04-12 VITALS — SYSTOLIC BLOOD PRESSURE: 142 MMHG | DIASTOLIC BLOOD PRESSURE: 90 MMHG

## 2021-04-12 VITALS
RESPIRATION RATE: 12 BRPM | WEIGHT: 223 LBS | BODY MASS INDEX: 35.84 KG/M2 | DIASTOLIC BLOOD PRESSURE: 85 MMHG | HEIGHT: 66 IN | SYSTOLIC BLOOD PRESSURE: 150 MMHG

## 2021-04-12 PROCEDURE — 99072 ADDL SUPL MATRL&STAF TM PHE: CPT

## 2021-04-12 PROCEDURE — 99213 OFFICE O/P EST LOW 20 MIN: CPT

## 2021-04-12 NOTE — PHYSICAL EXAM
[General Appearance - Well Developed] : well developed [Normal Appearance] : normal appearance [Well Groomed] : well groomed [General Appearance - Well Nourished] : well nourished [No Deformities] : no deformities [General Appearance - In No Acute Distress] : no acute distress [Normal Conjunctiva] : the conjunctiva exhibited no abnormalities [Eyelids - No Xanthelasma] : the eyelids demonstrated no xanthelasmas [Normal Oral Mucosa] : normal oral mucosa [No Oral Pallor] : no oral pallor [No Oral Cyanosis] : no oral cyanosis [Normal Jugular Venous A Waves Present] : normal jugular venous A waves present [Normal Jugular Venous V Waves Present] : normal jugular venous V waves present [No Jugular Venous Bremudez A Waves] : no jugular venous bermudez A waves [Respiration, Rhythm And Depth] : normal respiratory rhythm and effort [Exaggerated Use Of Accessory Muscles For Inspiration] : no accessory muscle use [Auscultation Breath Sounds / Voice Sounds] : lungs were clear to auscultation bilaterally [Heart Rate And Rhythm] : heart rate and rhythm were normal [Heart Sounds] : normal S1 and S2 [Murmurs] : no murmurs present [Normal S1] : normal S1 [Normal S2] : normal S2 [II] : a grade 2 [Abdomen Soft] : soft [Abdomen Tenderness] : non-tender [Abdomen Mass (___ Cm)] : no abdominal mass palpated [Abnormal Walk] : normal gait [Gait - Sufficient For Exercise Testing] : the gait was sufficient for exercise testing [Nail Clubbing] : no clubbing of the fingernails [Cyanosis, Localized] : no localized cyanosis [Petechial Hemorrhages (___cm)] : no petechial hemorrhages [] : no ischemic changes [Right Carotid Bruit] : no bruit heard over the right carotid [Left Carotid Bruit] : no bruit heard over the left carotid

## 2021-04-19 ENCOUNTER — APPOINTMENT (OUTPATIENT)
Dept: HEART AND VASCULAR | Facility: CLINIC | Age: 46
End: 2021-04-19
Payer: COMMERCIAL

## 2021-04-19 PROCEDURE — 99072 ADDL SUPL MATRL&STAF TM PHE: CPT

## 2021-04-19 PROCEDURE — 93306 TTE W/DOPPLER COMPLETE: CPT

## 2021-05-10 ENCOUNTER — APPOINTMENT (OUTPATIENT)
Dept: HEART AND VASCULAR | Facility: CLINIC | Age: 46
End: 2021-05-10

## 2021-06-09 DIAGNOSIS — Z23 ENCOUNTER FOR IMMUNIZATION: ICD-10-CM

## 2021-07-08 ENCOUNTER — NON-APPOINTMENT (OUTPATIENT)
Age: 46
End: 2021-07-08

## 2021-07-08 ENCOUNTER — APPOINTMENT (OUTPATIENT)
Dept: HEART AND VASCULAR | Facility: CLINIC | Age: 46
End: 2021-07-08
Payer: COMMERCIAL

## 2021-07-08 VITALS
WEIGHT: 228 LBS | SYSTOLIC BLOOD PRESSURE: 134 MMHG | BODY MASS INDEX: 36.64 KG/M2 | HEIGHT: 66 IN | DIASTOLIC BLOOD PRESSURE: 80 MMHG

## 2021-07-08 PROCEDURE — 93000 ELECTROCARDIOGRAM COMPLETE: CPT

## 2021-07-08 PROCEDURE — 99214 OFFICE O/P EST MOD 30 MIN: CPT

## 2021-07-08 PROCEDURE — 36415 COLL VENOUS BLD VENIPUNCTURE: CPT

## 2021-07-08 RX ORDER — FEXOFENADINE HYDROCHLORIDE 180 MG/1
180 TABLET ORAL DAILY
Qty: 30 | Refills: 3 | Status: ACTIVE | COMMUNITY
Start: 2021-07-08 | End: 1900-01-01

## 2021-07-08 RX ORDER — FLUTICASONE PROPIONATE 50 UG/1
50 SPRAY, METERED NASAL DAILY
Qty: 48 | Refills: 5 | Status: ACTIVE | COMMUNITY
Start: 2020-01-16

## 2021-07-08 NOTE — ASSESSMENT
[FreeTextEntry1] : Impression \par will ower BB to labetolo 100bid \par sleep eval \par non drowsy decongestant \par \par

## 2021-07-08 NOTE — HISTORY OF PRESENT ILLNESS
[FreeTextEntry1] : mod fatique with sob at time " wheeze like feeling"\par has sinus congestion and throat swelling feling \par

## 2021-07-08 NOTE — PHYSICAL EXAM
[General Appearance - Well Developed] : well developed [Normal Appearance] : normal appearance [Well Groomed] : well groomed [General Appearance - Well Nourished] : well nourished [No Deformities] : no deformities [General Appearance - In No Acute Distress] : no acute distress [Normal Conjunctiva] : the conjunctiva exhibited no abnormalities [Eyelids - No Xanthelasma] : the eyelids demonstrated no xanthelasmas [Normal Oral Mucosa] : normal oral mucosa [No Oral Pallor] : no oral pallor [No Oral Cyanosis] : no oral cyanosis [Normal Jugular Venous A Waves Present] : normal jugular venous A waves present [Normal Jugular Venous V Waves Present] : normal jugular venous V waves present [No Jugular Venous Bermudez A Waves] : no jugular venous bermudez A waves [Respiration, Rhythm And Depth] : normal respiratory rhythm and effort [Exaggerated Use Of Accessory Muscles For Inspiration] : no accessory muscle use [Auscultation Breath Sounds / Voice Sounds] : lungs were clear to auscultation bilaterally [Normal S1] : normal S1 [Normal S2] : normal S2 [II] : a grade 2 [Abdomen Soft] : soft [Abdomen Tenderness] : non-tender [Abdomen Mass (___ Cm)] : no abdominal mass palpated [Abnormal Walk] : normal gait [Gait - Sufficient For Exercise Testing] : the gait was sufficient for exercise testing [Nail Clubbing] : no clubbing of the fingernails [Cyanosis, Localized] : no localized cyanosis [Petechial Hemorrhages (___cm)] : no petechial hemorrhages [] : no ischemic changes [Right Carotid Bruit] : no bruit heard over the right carotid [Left Carotid Bruit] : no bruit heard over the left carotid

## 2021-07-09 LAB
BASOPHILS # BLD AUTO: 0.06 K/UL
BASOPHILS NFR BLD AUTO: 0.9 %
EOSINOPHIL # BLD AUTO: 0.15 K/UL
EOSINOPHIL NFR BLD AUTO: 2.2 %
HCT VFR BLD CALC: 40.1 %
HGB BLD-MCNC: 12.6 G/DL
IMM GRANULOCYTES NFR BLD AUTO: 0.1 %
LYMPHOCYTES # BLD AUTO: 2.19 K/UL
LYMPHOCYTES NFR BLD AUTO: 32.4 %
MAN DIFF?: NORMAL
MCHC RBC-ENTMCNC: 28.2 PG
MCHC RBC-ENTMCNC: 31.4 GM/DL
MCV RBC AUTO: 89.7 FL
MONOCYTES # BLD AUTO: 0.47 K/UL
MONOCYTES NFR BLD AUTO: 7 %
NEUTROPHILS # BLD AUTO: 3.87 K/UL
NEUTROPHILS NFR BLD AUTO: 57.4 %
PLATELET # BLD AUTO: 269 K/UL
RBC # BLD: 4.47 M/UL
RBC # FLD: 13.5 %
WBC # FLD AUTO: 6.75 K/UL

## 2021-07-12 LAB
25(OH)D3 SERPL-MCNC: 35.4 NG/ML
ALBUMIN SERPL ELPH-MCNC: 4.3 G/DL
ALP BLD-CCNC: 76 U/L
ALT SERPL-CCNC: 7 U/L
ANION GAP SERPL CALC-SCNC: 10 MMOL/L
AST SERPL-CCNC: 11 U/L
BILIRUB SERPL-MCNC: 0.2 MG/DL
BUN SERPL-MCNC: 14 MG/DL
CALCIUM SERPL-MCNC: 10.1 MG/DL
CHLORIDE SERPL-SCNC: 102 MMOL/L
CHOLEST SERPL-MCNC: 158 MG/DL
CO2 SERPL-SCNC: 27 MMOL/L
CREAT SERPL-MCNC: 0.97 MG/DL
ESTIMATED AVERAGE GLUCOSE: 114 MG/DL
FOLATE SERPL-MCNC: 10.8 NG/ML
GLUCOSE SERPL-MCNC: 121 MG/DL
HBA1C MFR BLD HPLC: 5.6 %
HDLC SERPL-MCNC: 58 MG/DL
LDLC SERPL CALC-MCNC: 74 MG/DL
NONHDLC SERPL-MCNC: 100 MG/DL
POTASSIUM SERPL-SCNC: 4.8 MMOL/L
PROT SERPL-MCNC: 6.8 G/DL
SODIUM SERPL-SCNC: 139 MMOL/L
T3FREE SERPL-MCNC: 2.64 PG/ML
T4 FREE SERPL-MCNC: 1.2 NG/DL
T4 SERPL-MCNC: 6.1 UG/DL
TRIGL SERPL-MCNC: 131 MG/DL
TSH SERPL-ACNC: 0.59 UIU/ML
VIT B12 SERPL-MCNC: 710 PG/ML

## 2021-07-23 ENCOUNTER — APPOINTMENT (OUTPATIENT)
Dept: HEART AND VASCULAR | Facility: CLINIC | Age: 46
End: 2021-07-23
Payer: COMMERCIAL

## 2021-07-23 VITALS — WEIGHT: 229 LBS | HEIGHT: 66 IN | OXYGEN SATURATION: 100 % | BODY MASS INDEX: 36.8 KG/M2 | HEART RATE: 71 BPM

## 2021-07-23 VITALS — DIASTOLIC BLOOD PRESSURE: 96 MMHG | SYSTOLIC BLOOD PRESSURE: 150 MMHG

## 2021-07-23 DIAGNOSIS — H92.09 OTALGIA, UNSPECIFIED EAR: ICD-10-CM

## 2021-07-23 PROCEDURE — 99213 OFFICE O/P EST LOW 20 MIN: CPT

## 2021-07-23 RX ORDER — AMLODIPINE BESYLATE 10 MG/1
10 TABLET ORAL
Qty: 90 | Refills: 3 | Status: DISCONTINUED | COMMUNITY
Start: 2019-12-02 | End: 2021-07-23

## 2021-07-23 RX ORDER — LISINOPRIL 40 MG/1
40 TABLET ORAL
Qty: 90 | Refills: 3 | Status: DISCONTINUED | COMMUNITY
Start: 2018-09-27 | End: 2021-07-23

## 2021-08-04 NOTE — HISTORY OF PRESENT ILLNESS
[Congestion] : congestion [Sore Throat] : sore throat [Earache (L)] : pain in left ear [Headache] : headache [FreeTextEntry8] : Pt was seen and evaluated for similar symptoms 2 weeks ago; continues to have similar symptoms; \par She feels like her throat is swollen, has positive earache; and ears are tender during examination. Has infrequent cough, usually dry and non productive.  Also noted LE/swelling/bilaterally.\par Pt is also weak and fatigue during the day often; Labetalol dose reduced last visit without marked improvement.

## 2021-08-04 NOTE — PLAN
[FreeTextEntry1] : Sore throat is possibly attributed to allergies: continue antihistamines. \par Dry Cough could have been caused by an ACE inhibitor\par HTN: changed medication regimen; prescriptions sent to the pharmacy of choice. \par Pt will maintain low fat/low sodium diet. \par Pt is encouraged to call or come back to the office if symptoms worsen or do not improve in 1-2 days - pt verbalized good understanding.

## 2021-09-23 ENCOUNTER — NON-APPOINTMENT (OUTPATIENT)
Age: 46
End: 2021-09-23

## 2021-09-23 ENCOUNTER — APPOINTMENT (OUTPATIENT)
Dept: HEART AND VASCULAR | Facility: CLINIC | Age: 46
End: 2021-09-23
Payer: COMMERCIAL

## 2021-09-23 VITALS
WEIGHT: 222 LBS | DIASTOLIC BLOOD PRESSURE: 90 MMHG | RESPIRATION RATE: 12 BRPM | SYSTOLIC BLOOD PRESSURE: 148 MMHG | BODY MASS INDEX: 35.68 KG/M2 | HEART RATE: 72 BPM | HEIGHT: 66 IN

## 2021-09-23 PROCEDURE — 99213 OFFICE O/P EST LOW 20 MIN: CPT

## 2021-09-23 PROCEDURE — 36415 COLL VENOUS BLD VENIPUNCTURE: CPT

## 2021-09-23 NOTE — PHYSICAL EXAM

## 2021-09-23 NOTE — HISTORY OF PRESENT ILLNESS
[Cold Symptoms] : cold symptoms [Moderate] : moderate [___ Weeks ago] :  [unfilled] weeks ago [Sore Throat] : sore throat [Fever] : fever [Tmax= ___] : Tmax = [unfilled] [NSAIDs] : NSAIDs [Congestion] : no congestion [Cough] : no cough [Shortness Of Breath] : no shortness of breath

## 2021-09-27 ENCOUNTER — APPOINTMENT (OUTPATIENT)
Dept: PULMONOLOGY | Facility: CLINIC | Age: 46
End: 2021-09-27

## 2021-09-27 LAB
BACTERIA THROAT CULT: NORMAL
HETEROPH AB SER QL: NEGATIVE

## 2021-10-04 ENCOUNTER — RX RENEWAL (OUTPATIENT)
Age: 46
End: 2021-10-04

## 2021-12-25 ENCOUNTER — NON-APPOINTMENT (OUTPATIENT)
Age: 46
End: 2021-12-25

## 2022-01-06 ENCOUNTER — APPOINTMENT (OUTPATIENT)
Dept: HEART AND VASCULAR | Facility: CLINIC | Age: 47
End: 2022-01-06

## 2022-01-17 ENCOUNTER — NON-APPOINTMENT (OUTPATIENT)
Age: 47
End: 2022-01-17

## 2022-01-17 ENCOUNTER — APPOINTMENT (OUTPATIENT)
Dept: HEART AND VASCULAR | Facility: CLINIC | Age: 47
End: 2022-01-17
Payer: COMMERCIAL

## 2022-01-17 ENCOUNTER — LABORATORY RESULT (OUTPATIENT)
Age: 47
End: 2022-01-17

## 2022-01-17 VITALS
DIASTOLIC BLOOD PRESSURE: 82 MMHG | HEIGHT: 66 IN | RESPIRATION RATE: 12 BRPM | HEART RATE: 75 BPM | SYSTOLIC BLOOD PRESSURE: 145 MMHG | WEIGHT: 221 LBS | BODY MASS INDEX: 35.52 KG/M2

## 2022-01-17 PROCEDURE — 99214 OFFICE O/P EST MOD 30 MIN: CPT

## 2022-01-17 PROCEDURE — 36415 COLL VENOUS BLD VENIPUNCTURE: CPT

## 2022-01-17 PROCEDURE — 93000 ELECTROCARDIOGRAM COMPLETE: CPT

## 2022-01-17 RX ORDER — DICYCLOMINE HYDROCHLORIDE 20 MG/1
20 TABLET ORAL 4 TIMES DAILY
Qty: 56 | Refills: 0 | Status: DISCONTINUED | COMMUNITY
Start: 2021-02-03 | End: 2022-01-17

## 2022-01-17 RX ORDER — AZITHROMYCIN 250 MG/1
250 TABLET, FILM COATED ORAL
Qty: 1 | Refills: 0 | Status: DISCONTINUED | COMMUNITY
Start: 2021-07-23 | End: 2022-01-17

## 2022-01-17 RX ORDER — PREDNISONE 10 MG/1
10 TABLET ORAL
Qty: 10 | Refills: 0 | Status: DISCONTINUED | COMMUNITY
Start: 2022-01-14 | End: 2022-01-17

## 2022-01-17 RX ORDER — ALBUTEROL SULFATE 90 UG/1
108 (90 BASE) INHALANT RESPIRATORY (INHALATION) EVERY 6 HOURS
Qty: 1 | Refills: 3 | Status: ACTIVE | COMMUNITY
Start: 2020-09-14

## 2022-01-17 RX ORDER — FLUTICASONE FUROATE AND VILANTEROL TRIFENATATE 100; 25 UG/1; UG/1
100-25 POWDER RESPIRATORY (INHALATION) TWICE DAILY
Qty: 1 | Refills: 0 | Status: DISCONTINUED | COMMUNITY
Start: 2022-01-14 | End: 2022-01-17

## 2022-01-17 NOTE — HISTORY OF PRESENT ILLNESS
[FreeTextEntry1] : recent covid infection 1/3/22 \par has had flare up from covid with cough and wheeze \par poorly tolerant of mask \par recent sleep study c/w KORY awaiting cpap machine \par

## 2022-01-17 NOTE — ASSESSMENT
[FreeTextEntry1] : Impression \par Copd triggered by covid \par will  start Symbicort \par await Cpap machine \par Due to Pulm condition patient is intolerant of chronic mask wearing and would be best suited to work at home or in isolated office at work

## 2022-01-18 ENCOUNTER — NON-APPOINTMENT (OUTPATIENT)
Age: 47
End: 2022-01-18

## 2022-01-18 ENCOUNTER — APPOINTMENT (OUTPATIENT)
Dept: HEART AND VASCULAR | Facility: CLINIC | Age: 47
End: 2022-01-18
Payer: COMMERCIAL

## 2022-01-18 VITALS
SYSTOLIC BLOOD PRESSURE: 148 MMHG | RESPIRATION RATE: 12 BRPM | DIASTOLIC BLOOD PRESSURE: 90 MMHG | WEIGHT: 221 LBS | BODY MASS INDEX: 35.52 KG/M2 | HEIGHT: 66 IN

## 2022-01-18 LAB
25(OH)D3 SERPL-MCNC: 39.7 NG/ML
ALBUMIN SERPL ELPH-MCNC: 4.2 G/DL
ALP BLD-CCNC: 70 U/L
ALT SERPL-CCNC: 6 U/L
ANION GAP SERPL CALC-SCNC: 11 MMOL/L
AST SERPL-CCNC: 10 U/L
BASOPHILS # BLD AUTO: 0.06 K/UL
BASOPHILS NFR BLD AUTO: 1 %
BILIRUB SERPL-MCNC: 0.3 MG/DL
BUN SERPL-MCNC: 12 MG/DL
CALCIUM SERPL-MCNC: 9.7 MG/DL
CHLORIDE SERPL-SCNC: 107 MMOL/L
CHOLEST SERPL-MCNC: 153 MG/DL
CO2 SERPL-SCNC: 22 MMOL/L
CREAT SERPL-MCNC: 1.07 MG/DL
EOSINOPHIL # BLD AUTO: 0.13 K/UL
EOSINOPHIL NFR BLD AUTO: 2.1 %
ESTIMATED AVERAGE GLUCOSE: 117 MG/DL
FOLATE SERPL-MCNC: 10 NG/ML
GLUCOSE SERPL-MCNC: 111 MG/DL
HBA1C MFR BLD HPLC: 5.7 %
HCT VFR BLD CALC: 41.5 %
HDLC SERPL-MCNC: 52 MG/DL
HGB BLD-MCNC: 13.2 G/DL
IMM GRANULOCYTES NFR BLD AUTO: 0.3 %
LDLC SERPL CALC-MCNC: 92 MG/DL
LYMPHOCYTES # BLD AUTO: 2.27 K/UL
LYMPHOCYTES NFR BLD AUTO: 36.5 %
MAN DIFF?: NORMAL
MCHC RBC-ENTMCNC: 28.4 PG
MCHC RBC-ENTMCNC: 31.8 GM/DL
MCV RBC AUTO: 89.2 FL
MONOCYTES # BLD AUTO: 0.38 K/UL
MONOCYTES NFR BLD AUTO: 6.1 %
NEUTROPHILS # BLD AUTO: 3.36 K/UL
NEUTROPHILS NFR BLD AUTO: 54 %
NONHDLC SERPL-MCNC: 101 MG/DL
PLATELET # BLD AUTO: 250 K/UL
POTASSIUM SERPL-SCNC: 4.7 MMOL/L
PROT SERPL-MCNC: 6.8 G/DL
RBC # BLD: 4.65 M/UL
RBC # FLD: 13.8 %
SODIUM SERPL-SCNC: 140 MMOL/L
T3 SERPL-MCNC: 101 NG/DL
T3FREE SERPL-MCNC: 2.81 PG/ML
T3RU NFR SERPL: 1 TBI
T4 FREE SERPL-MCNC: 1.3 NG/DL
T4 SERPL-MCNC: 7.2 UG/DL
TRIGL SERPL-MCNC: 47 MG/DL
TSH SERPL-ACNC: 0.51 UIU/ML
VIT B12 SERPL-MCNC: 1984 PG/ML
WBC # FLD AUTO: 6.22 K/UL

## 2022-01-18 PROCEDURE — 99213 OFFICE O/P EST LOW 20 MIN: CPT

## 2022-01-20 LAB — SARS-COV-2 N GENE NPH QL NAA+PROBE: DETECTED

## 2022-02-15 NOTE — ASSESSMENT
[FreeTextEntry1] : I have informed pt that pcr can remain positive for some time after recovery \par Patient is medically cleared for tonsillectomy.

## 2022-02-15 NOTE — HISTORY OF PRESENT ILLNESS
[FreeTextEntry8] : post covid \par had covid in early Jan 2022 bur=t needs pcr to get back to work \par no active symptoms

## 2022-02-16 ENCOUNTER — NON-APPOINTMENT (OUTPATIENT)
Age: 47
End: 2022-02-16

## 2022-02-16 ENCOUNTER — APPOINTMENT (OUTPATIENT)
Dept: HEART AND VASCULAR | Facility: CLINIC | Age: 47
End: 2022-02-16
Payer: COMMERCIAL

## 2022-02-16 VITALS
HEART RATE: 64 BPM | DIASTOLIC BLOOD PRESSURE: 90 MMHG | WEIGHT: 221 LBS | BODY MASS INDEX: 35.52 KG/M2 | HEIGHT: 66 IN | RESPIRATION RATE: 12 BRPM | SYSTOLIC BLOOD PRESSURE: 148 MMHG

## 2022-02-16 DIAGNOSIS — B96.89 ACUTE PHARYNGITIS DUE TO OTHER SPECIFIED ORGANISMS: ICD-10-CM

## 2022-02-16 DIAGNOSIS — J02.8 ACUTE PHARYNGITIS DUE TO OTHER SPECIFIED ORGANISMS: ICD-10-CM

## 2022-02-16 PROCEDURE — 99214 OFFICE O/P EST MOD 30 MIN: CPT

## 2022-02-16 PROCEDURE — 93000 ELECTROCARDIOGRAM COMPLETE: CPT

## 2022-02-16 NOTE — HISTORY OF PRESENT ILLNESS
[Preoperative Visit] : for a medical evaluation prior to surgery [Scheduled Procedure ___] : a [unfilled] [Date of Surgery ___] : on [unfilled] [Surgeon Name ___] : surgeon: [unfilled] [Good] : Good [Diabetes] : diabetes [Prior Anesthesia] : Prior anesthesia [Metabolic Capacity ___Mets%] : The patient has a metabolic capacity of [unfilled] Mets%  [Fever] : no fever [Chills] : no chills [Cardiovascular Disease] : no cardiovascular disease [Pulmonary Disease] : no pulmonary disease [Renal Disease] : no renal disease [GI Disease] : no gastrointestinal disease [Sleep Apnea] : no sleep apnea [Bleeding Disorder] : no bleeding disorder [Transfusion Reaction] : no transfusion reaction [Prev Anesthesia Reaction] : no previous anesthesia reaction [de-identified] : Dr Bhatti  [FreeTextEntry1] : Seen by ENT for KORY and mod asthma \par Is preop for tonsillectomy

## 2022-02-16 NOTE — PHYSICAL EXAM
[General Appearance - Well Developed] : well developed [Normal Appearance] : normal appearance [Well Groomed] : well groomed [General Appearance - Well Nourished] : well nourished [No Deformities] : no deformities [General Appearance - In No Acute Distress] : no acute distress [Normal Conjunctiva] : the conjunctiva exhibited no abnormalities [Eyelids - No Xanthelasma] : the eyelids demonstrated no xanthelasmas [Normal Oral Mucosa] : normal oral mucosa [No Oral Pallor] : no oral pallor [No Oral Cyanosis] : no oral cyanosis [Normal Jugular Venous A Waves Present] : normal jugular venous A waves present [Normal Jugular Venous V Waves Present] : normal jugular venous V waves present [No Jugular Venous Bermudez A Waves] : no jugular venous bermudez A waves [Respiration, Rhythm And Depth] : normal respiratory rhythm and effort [Exaggerated Use Of Accessory Muscles For Inspiration] : no accessory muscle use [Auscultation Breath Sounds / Voice Sounds] : lungs were clear to auscultation bilaterally [Heart Rate And Rhythm] : heart rate and rhythm were normal [Heart Sounds] : normal S1 and S2 [Murmurs] : no murmurs present [Abdomen Tenderness] : non-tender [Abdomen Soft] : soft [] : no hepato-splenomegaly [Abdomen Mass (___ Cm)] : no abdominal mass palpated [Abnormal Walk] : normal gait [Gait - Sufficient For Exercise Testing] : the gait was sufficient for exercise testing

## 2022-03-14 ENCOUNTER — RX RENEWAL (OUTPATIENT)
Age: 47
End: 2022-03-14

## 2022-03-24 ENCOUNTER — NON-APPOINTMENT (OUTPATIENT)
Age: 47
End: 2022-03-24

## 2022-03-28 ENCOUNTER — APPOINTMENT (OUTPATIENT)
Dept: HEART AND VASCULAR | Facility: CLINIC | Age: 47
End: 2022-03-28
Payer: COMMERCIAL

## 2022-03-28 ENCOUNTER — NON-APPOINTMENT (OUTPATIENT)
Age: 47
End: 2022-03-28

## 2022-03-28 ENCOUNTER — LABORATORY RESULT (OUTPATIENT)
Age: 47
End: 2022-03-28

## 2022-03-28 VITALS
DIASTOLIC BLOOD PRESSURE: 94 MMHG | WEIGHT: 226 LBS | HEART RATE: 81 BPM | SYSTOLIC BLOOD PRESSURE: 158 MMHG | BODY MASS INDEX: 36.32 KG/M2 | HEIGHT: 66 IN

## 2022-03-28 DIAGNOSIS — G81.94 HEMIPLEGIA, UNSPECIFIED AFFECTING LEFT NONDOMINANT SIDE: ICD-10-CM

## 2022-03-28 PROCEDURE — 93880 EXTRACRANIAL BILAT STUDY: CPT

## 2022-03-28 PROCEDURE — 36415 COLL VENOUS BLD VENIPUNCTURE: CPT

## 2022-03-28 PROCEDURE — 99214 OFFICE O/P EST MOD 30 MIN: CPT

## 2022-03-28 PROCEDURE — ZZZZZ: CPT

## 2022-03-28 PROCEDURE — 93000 ELECTROCARDIOGRAM COMPLETE: CPT

## 2022-03-28 NOTE — REVIEW OF SYSTEMS
Problem 1 elevated blood pressure in a patient with hypertension. All her home blood pressure readings were elevated. Today her blood pressure was in normal range she denies any headaches dizziness chest pain or shortness of breath.   She states she is un [Fever] : no fever [Headache] : no headache [Chills] : no chills [Feeling Fatigued] : not feeling fatigued [SOB] : shortness of breath [Wheezing] : wheezing [de-identified] : left arm weakness

## 2022-03-28 NOTE — HISTORY OF PRESENT ILLNESS
[FreeTextEntry1] : Was seen in ER [facility] for sudden weakness left arm and inability to flex wrist \par was on steroid rx for wheeze and asthma \par still has residual weakness \par CT by rept was neg \par getting work up for ok \par waiting for cpap

## 2022-03-28 NOTE — PHYSICAL EXAM
[Normal] : normal gait [de-identified] : no wheeze  [de-identified] : 3/5 motor strength left arm with dec flexion and extension

## 2022-03-28 NOTE — DISCUSSION/SUMMARY
[Hypertension] : hypertension [Deteriorating] : deteriorating [Not Responding to Treatment] : not responding to treatment [Medication Changes Per Orders] : Medication changes are as documented in orders [Continue] : continuing beta blockers [Begin] : beginning aldosterone antagonists [Patient] : the patient [FreeTextEntry4] : copd needs f/u for ok [FreeTextEntry1] : carotid du[lillian neg for obstructive disease

## 2022-03-29 LAB
25(OH)D3 SERPL-MCNC: 42.2 NG/ML
ALBUMIN SERPL ELPH-MCNC: 4.7 G/DL
ALP BLD-CCNC: 76 U/L
ALT SERPL-CCNC: 6 U/L
ANION GAP SERPL CALC-SCNC: 12 MMOL/L
AST SERPL-CCNC: 10 U/L
BASOPHILS # BLD AUTO: 0.08 K/UL
BASOPHILS NFR BLD AUTO: 0.8 %
BILIRUB SERPL-MCNC: 0.2 MG/DL
BUN SERPL-MCNC: 20 MG/DL
CALCIUM SERPL-MCNC: 9.9 MG/DL
CHLORIDE SERPL-SCNC: 103 MMOL/L
CHOLEST SERPL-MCNC: 190 MG/DL
CO2 SERPL-SCNC: 25 MMOL/L
CREAT SERPL-MCNC: 1.15 MG/DL
EGFR: 60 ML/MIN/1.73M2
EOSINOPHIL # BLD AUTO: 0.21 K/UL
EOSINOPHIL NFR BLD AUTO: 2.1 %
ESTIMATED AVERAGE GLUCOSE: 114 MG/DL
FOLATE SERPL-MCNC: 6.3 NG/ML
GLUCOSE SERPL-MCNC: 83 MG/DL
HBA1C MFR BLD HPLC: 5.6 %
HCT VFR BLD CALC: 42.7 %
HDLC SERPL-MCNC: 69 MG/DL
HGB BLD-MCNC: 13.4 G/DL
IMM GRANULOCYTES NFR BLD AUTO: 0.4 %
LDLC SERPL CALC-MCNC: 91 MG/DL
LYMPHOCYTES # BLD AUTO: 3.07 K/UL
LYMPHOCYTES NFR BLD AUTO: 30.8 %
MAN DIFF?: NORMAL
MCHC RBC-ENTMCNC: 28.4 PG
MCHC RBC-ENTMCNC: 31.4 GM/DL
MCV RBC AUTO: 90.5 FL
MONOCYTES # BLD AUTO: 0.61 K/UL
MONOCYTES NFR BLD AUTO: 6.1 %
NEUTROPHILS # BLD AUTO: 5.95 K/UL
NEUTROPHILS NFR BLD AUTO: 59.8 %
NONHDLC SERPL-MCNC: 121 MG/DL
PLATELET # BLD AUTO: 278 K/UL
POTASSIUM SERPL-SCNC: 4.6 MMOL/L
PROT SERPL-MCNC: 7.4 G/DL
RBC # BLD: 4.72 M/UL
RBC # FLD: 14.1 %
SODIUM SERPL-SCNC: 140 MMOL/L
T3 SERPL-MCNC: 108 NG/DL
T3FREE SERPL-MCNC: 2.85 PG/ML
T3RU NFR SERPL: 1 TBI
T4 FREE SERPL-MCNC: 1.2 NG/DL
T4 SERPL-MCNC: 7.6 UG/DL
TRIGL SERPL-MCNC: 147 MG/DL
TSH SERPL-ACNC: 0.61 UIU/ML
VIT B12 SERPL-MCNC: 1324 PG/ML
WBC # FLD AUTO: 9.96 K/UL

## 2022-04-12 ENCOUNTER — APPOINTMENT (OUTPATIENT)
Dept: HEART AND VASCULAR | Facility: CLINIC | Age: 47
End: 2022-04-12

## 2022-05-10 ENCOUNTER — APPOINTMENT (OUTPATIENT)
Dept: HEART AND VASCULAR | Facility: CLINIC | Age: 47
End: 2022-05-10
Payer: COMMERCIAL

## 2022-05-10 VITALS
DIASTOLIC BLOOD PRESSURE: 90 MMHG | WEIGHT: 225 LBS | SYSTOLIC BLOOD PRESSURE: 150 MMHG | HEART RATE: 76 BPM | RESPIRATION RATE: 12 BRPM | HEIGHT: 66 IN | BODY MASS INDEX: 36.16 KG/M2

## 2022-05-10 PROCEDURE — 99213 OFFICE O/P EST LOW 20 MIN: CPT

## 2022-05-10 NOTE — PLAN
[FreeTextEntry1] : 1. URI: Likely viral; will send off respiratory viral panel. For now advised plenty of rest relaxation warm fluids and to call or return is symptoms worsen fever develops.

## 2022-05-10 NOTE — HISTORY OF PRESENT ILLNESS
[FreeTextEntry8] : 46-year-old female and extensive past medical history comes in for evaluation of a URI. Patient reports a few days of headache runny nose of clear exudate fatigue. Has ruled out coronavirus on a home test. Denies any fever.

## 2022-05-12 ENCOUNTER — NON-APPOINTMENT (OUTPATIENT)
Age: 47
End: 2022-05-12

## 2022-05-12 LAB
HMPV RNA SPEC QL NAA+PROBE: DETECTED
RAPID RVP RESULT: DETECTED
SARS-COV-2 RNA PNL RESP NAA+PROBE: NOT DETECTED

## 2022-05-25 ENCOUNTER — RX RENEWAL (OUTPATIENT)
Age: 47
End: 2022-05-25

## 2022-05-25 RX ORDER — BUDESONIDE AND FORMOTEROL FUMARATE DIHYDRATE 160; 4.5 UG/1; UG/1
160-4.5 AEROSOL RESPIRATORY (INHALATION)
Qty: 1 | Refills: 3 | Status: ACTIVE | COMMUNITY
Start: 2022-05-25 | End: 1900-01-01

## 2022-08-01 ENCOUNTER — NON-APPOINTMENT (OUTPATIENT)
Age: 47
End: 2022-08-01

## 2022-08-01 ENCOUNTER — RX RENEWAL (OUTPATIENT)
Age: 47
End: 2022-08-01

## 2022-08-01 ENCOUNTER — LABORATORY RESULT (OUTPATIENT)
Age: 47
End: 2022-08-01

## 2022-08-01 ENCOUNTER — APPOINTMENT (OUTPATIENT)
Dept: HEART AND VASCULAR | Facility: CLINIC | Age: 47
End: 2022-08-01

## 2022-08-01 ENCOUNTER — APPOINTMENT (OUTPATIENT)
Dept: HEART AND VASCULAR | Facility: CLINIC | Age: 47
End: 2022-08-01
Payer: COMMERCIAL

## 2022-08-01 VITALS
WEIGHT: 230 LBS | HEIGHT: 66 IN | HEART RATE: 71 BPM | SYSTOLIC BLOOD PRESSURE: 160 MMHG | BODY MASS INDEX: 36.96 KG/M2 | DIASTOLIC BLOOD PRESSURE: 92 MMHG

## 2022-08-01 DIAGNOSIS — Z86.39 PERSONAL HISTORY OF OTHER ENDOCRINE, NUTRITIONAL AND METABOLIC DISEASE: ICD-10-CM

## 2022-08-01 PROCEDURE — 93000 ELECTROCARDIOGRAM COMPLETE: CPT

## 2022-08-01 PROCEDURE — 93306 TTE W/DOPPLER COMPLETE: CPT

## 2022-08-01 PROCEDURE — 99214 OFFICE O/P EST MOD 30 MIN: CPT | Mod: 25

## 2022-08-01 NOTE — REVIEW OF SYSTEMS
[SOB] : shortness of breath [Wheezing] : wheezing [Fever] : no fever [Headache] : no headache [Chills] : no chills [Feeling Fatigued] : not feeling fatigued [de-identified] : left arm weakness

## 2022-08-01 NOTE — PHYSICAL EXAM
[Normal] : normal gait [de-identified] : no wheeze  [de-identified] : 3/5 motor strength left arm with dec flexion and extension

## 2022-08-01 NOTE — DISCUSSION/SUMMARY
[Hypertension] : hypertension [Deteriorating] : deteriorating [Not Responding to Treatment] : not responding to treatment [Medication Changes Per Orders] : Medication changes are as documented in orders [Continue] : continuing beta blockers [Begin] : beginning aldosterone antagonists [Patient] : the patient [FreeTextEntry4] : copd needs f/u for ok [EKG obtained to assist in diagnosis and management of assessed problem(s)] : EKG obtained to assist in diagnosis and management of assessed problem(s)

## 2022-08-01 NOTE — HISTORY OF PRESENT ILLNESS
[FreeTextEntry1] : progressive wgt gain despite claim she is reducing carbohydrtaes \par BP remains elevated

## 2022-08-02 LAB
25(OH)D3 SERPL-MCNC: 32.9 NG/ML
ALBUMIN SERPL ELPH-MCNC: 4.3 G/DL
ALP BLD-CCNC: 75 U/L
ALT SERPL-CCNC: 8 U/L
ANION GAP SERPL CALC-SCNC: 11 MMOL/L
AST SERPL-CCNC: 12 U/L
BASOPHILS # BLD AUTO: 0.07 K/UL
BASOPHILS NFR BLD AUTO: 1 %
BILIRUB SERPL-MCNC: 0.3 MG/DL
BUN SERPL-MCNC: 15 MG/DL
CALCIUM SERPL-MCNC: 9.9 MG/DL
CHLORIDE SERPL-SCNC: 102 MMOL/L
CHOLEST SERPL-MCNC: 173 MG/DL
CO2 SERPL-SCNC: 27 MMOL/L
CREAT SERPL-MCNC: 1.36 MG/DL
EGFR: 48 ML/MIN/1.73M2
EOSINOPHIL # BLD AUTO: 0.17 K/UL
EOSINOPHIL NFR BLD AUTO: 2.4 %
ESTIMATED AVERAGE GLUCOSE: 117 MG/DL
FOLATE SERPL-MCNC: 8.4 NG/ML
GLUCOSE SERPL-MCNC: 91 MG/DL
HBA1C MFR BLD HPLC: 5.7 %
HCT VFR BLD CALC: 40.7 %
HDLC SERPL-MCNC: 55 MG/DL
HGB BLD-MCNC: 12.7 G/DL
IMM GRANULOCYTES NFR BLD AUTO: 0.4 %
LDLC SERPL CALC-MCNC: 90 MG/DL
LYMPHOCYTES # BLD AUTO: 2.22 K/UL
LYMPHOCYTES NFR BLD AUTO: 31.1 %
MAN DIFF?: NORMAL
MCHC RBC-ENTMCNC: 28.5 PG
MCHC RBC-ENTMCNC: 31.2 GM/DL
MCV RBC AUTO: 91.3 FL
MONOCYTES # BLD AUTO: 0.51 K/UL
MONOCYTES NFR BLD AUTO: 7.1 %
NEUTROPHILS # BLD AUTO: 4.14 K/UL
NEUTROPHILS NFR BLD AUTO: 58 %
NONHDLC SERPL-MCNC: 118 MG/DL
PLATELET # BLD AUTO: 259 K/UL
POTASSIUM SERPL-SCNC: 4.6 MMOL/L
PROT SERPL-MCNC: 7 G/DL
RBC # BLD: 4.46 M/UL
RBC # FLD: 14.2 %
SODIUM SERPL-SCNC: 139 MMOL/L
T3 SERPL-MCNC: 101 NG/DL
T3FREE SERPL-MCNC: 2.96 PG/ML
T3RU NFR SERPL: 1 TBI
T4 FREE SERPL-MCNC: 1.2 NG/DL
T4 SERPL-MCNC: 7.1 UG/DL
TRIGL SERPL-MCNC: 138 MG/DL
TSH SERPL-ACNC: 0.7 UIU/ML
VIT B12 SERPL-MCNC: 1010 PG/ML
WBC # FLD AUTO: 7.14 K/UL

## 2022-08-05 ENCOUNTER — NON-APPOINTMENT (OUTPATIENT)
Age: 47
End: 2022-08-05

## 2022-09-13 ENCOUNTER — APPOINTMENT (OUTPATIENT)
Dept: HEART AND VASCULAR | Facility: CLINIC | Age: 47
End: 2022-09-13
Payer: COMMERCIAL

## 2022-09-13 VITALS
HEART RATE: 70 BPM | WEIGHT: 233 LBS | RESPIRATION RATE: 12 BRPM | SYSTOLIC BLOOD PRESSURE: 154 MMHG | BODY MASS INDEX: 37.61 KG/M2 | DIASTOLIC BLOOD PRESSURE: 100 MMHG

## 2022-09-13 DIAGNOSIS — R19.7 DIARRHEA, UNSPECIFIED: ICD-10-CM

## 2022-09-13 DIAGNOSIS — Z87.898 PERSONAL HISTORY OF OTHER SPECIFIED CONDITIONS: ICD-10-CM

## 2022-09-13 PROCEDURE — 99214 OFFICE O/P EST MOD 30 MIN: CPT

## 2022-09-13 RX ORDER — LOSARTAN POTASSIUM AND HYDROCHLOROTHIAZIDE 25; 100 MG/1; MG/1
100-25 TABLET ORAL DAILY
Qty: 90 | Refills: 1 | Status: DISCONTINUED | COMMUNITY
Start: 2021-07-23 | End: 2022-09-13

## 2022-09-13 NOTE — DISCUSSION/SUMMARY
[FreeTextEntry1] : 1. Fatigue: Likely in part due to recent episode of diarrhea. Advised to undergo a titration study to make sure no CPAP machine is titrated appropriately. Will obtain blood work.\par 2. Diarrhea: Advised to drink electrolyte-based drinks if symptoms persist will need stool culture. Advised BRAT\par 3. Hypertension: Blood pressure not at goal continue current medication for now advised to followup with PCP once diarrheal illness resolves.\par 4. COPD: Continue bronchodilators

## 2022-09-13 NOTE — HISTORY OF PRESENT ILLNESS
[FreeTextEntry1] : 47-year-old female with resistant hypertension comes in for evaluation of fatigue. Patient does report 2 days of watery nonbloody diarrhea. However is quite fatigued during the day despite use of a CPAP machine that is new. Of note, patient less fatigued on the weekends when away from work.

## 2022-09-13 NOTE — PHYSICAL EXAM

## 2022-09-14 LAB
ALBUMIN SERPL ELPH-MCNC: 4.6 G/DL
ALP BLD-CCNC: 76 U/L
ALT SERPL-CCNC: 10 U/L
ANION GAP SERPL CALC-SCNC: 12 MMOL/L
AST SERPL-CCNC: 11 U/L
BASOPHILS # BLD AUTO: 0.06 K/UL
BASOPHILS NFR BLD AUTO: 0.9 %
BILIRUB SERPL-MCNC: 0.3 MG/DL
BUN SERPL-MCNC: 14 MG/DL
CALCIUM SERPL-MCNC: 9.8 MG/DL
CHLORIDE SERPL-SCNC: 104 MMOL/L
CO2 SERPL-SCNC: 25 MMOL/L
CREAT SERPL-MCNC: 1.03 MG/DL
EGFR: 67 ML/MIN/1.73M2
EOSINOPHIL # BLD AUTO: 0.12 K/UL
EOSINOPHIL NFR BLD AUTO: 1.9 %
GLUCOSE SERPL-MCNC: 90 MG/DL
HCT VFR BLD CALC: 41.6 %
HGB BLD-MCNC: 12.7 G/DL
IMM GRANULOCYTES NFR BLD AUTO: 0.3 %
LYMPHOCYTES # BLD AUTO: 2.19 K/UL
LYMPHOCYTES NFR BLD AUTO: 34 %
MAGNESIUM SERPL-MCNC: 1.9 MG/DL
MAN DIFF?: NORMAL
MCHC RBC-ENTMCNC: 28 PG
MCHC RBC-ENTMCNC: 30.5 GM/DL
MCV RBC AUTO: 91.6 FL
MONOCYTES # BLD AUTO: 0.36 K/UL
MONOCYTES NFR BLD AUTO: 5.6 %
NEUTROPHILS # BLD AUTO: 3.7 K/UL
NEUTROPHILS NFR BLD AUTO: 57.3 %
PHOSPHATE SERPL-MCNC: 3.4 MG/DL
PLATELET # BLD AUTO: 269 K/UL
POTASSIUM SERPL-SCNC: 4.6 MMOL/L
PROT SERPL-MCNC: 7 G/DL
RBC # BLD: 4.54 M/UL
RBC # FLD: 14.3 %
SODIUM SERPL-SCNC: 141 MMOL/L
TSH SERPL-ACNC: 0.51 UIU/ML
WBC # FLD AUTO: 6.45 K/UL

## 2022-09-16 ENCOUNTER — APPOINTMENT (OUTPATIENT)
Dept: HEART AND VASCULAR | Facility: CLINIC | Age: 47
End: 2022-09-16
Payer: COMMERCIAL

## 2022-09-16 VITALS
WEIGHT: 232 LBS | BODY MASS INDEX: 37.28 KG/M2 | DIASTOLIC BLOOD PRESSURE: 90 MMHG | RESPIRATION RATE: 12 BRPM | SYSTOLIC BLOOD PRESSURE: 160 MMHG | HEIGHT: 66 IN

## 2022-09-16 PROCEDURE — 99213 OFFICE O/P EST LOW 20 MIN: CPT

## 2022-09-16 RX ORDER — SEMAGLUTIDE 1.34 MG/ML
2 INJECTION, SOLUTION SUBCUTANEOUS
Qty: 1 | Refills: 3 | Status: DISCONTINUED | COMMUNITY
Start: 2022-08-01 | End: 2022-09-16

## 2022-09-19 NOTE — HISTORY OF PRESENT ILLNESS
[FreeTextEntry1] : seen earlier this week for loose BM no has frequent solid BM \par no fever \par no chills \par feells tired on BB for BP control

## 2022-10-03 ENCOUNTER — RX RENEWAL (OUTPATIENT)
Age: 47
End: 2022-10-03

## 2022-10-03 RX ORDER — MONTELUKAST 10 MG/1
10 TABLET, FILM COATED ORAL DAILY
Qty: 30 | Refills: 3 | Status: ACTIVE | COMMUNITY
Start: 2020-09-14 | End: 1900-01-01

## 2022-11-23 ENCOUNTER — NON-APPOINTMENT (OUTPATIENT)
Age: 47
End: 2022-11-23

## 2022-11-23 ENCOUNTER — APPOINTMENT (OUTPATIENT)
Dept: HEART AND VASCULAR | Facility: CLINIC | Age: 47
End: 2022-11-23
Payer: COMMERCIAL

## 2022-11-23 VITALS
SYSTOLIC BLOOD PRESSURE: 168 MMHG | DIASTOLIC BLOOD PRESSURE: 92 MMHG | HEIGHT: 66 IN | WEIGHT: 229 LBS | RESPIRATION RATE: 12 BRPM | BODY MASS INDEX: 36.8 KG/M2 | HEART RATE: 90 BPM

## 2022-11-23 VITALS
DIASTOLIC BLOOD PRESSURE: 80 MMHG | WEIGHT: 228 LBS | BODY MASS INDEX: 36.64 KG/M2 | SYSTOLIC BLOOD PRESSURE: 150 MMHG | HEIGHT: 66 IN

## 2022-11-23 DIAGNOSIS — E11.9 TYPE 2 DIABETES MELLITUS W/OUT COMPLICATIONS: ICD-10-CM

## 2022-11-23 PROCEDURE — 93000 ELECTROCARDIOGRAM COMPLETE: CPT

## 2022-11-23 PROCEDURE — 99214 OFFICE O/P EST MOD 30 MIN: CPT | Mod: 25

## 2022-11-23 NOTE — ASSESSMENT
[FreeTextEntry1] : HTN \par marginal control Has been intolerant to many meds \par will try Rybelsus for wgt loss and better control of BP

## 2022-12-16 ENCOUNTER — APPOINTMENT (OUTPATIENT)
Dept: HEART AND VASCULAR | Facility: CLINIC | Age: 47
End: 2022-12-16

## 2022-12-19 ENCOUNTER — APPOINTMENT (OUTPATIENT)
Dept: HEART AND VASCULAR | Facility: CLINIC | Age: 47
End: 2022-12-19
Payer: COMMERCIAL

## 2022-12-19 ENCOUNTER — LABORATORY RESULT (OUTPATIENT)
Age: 47
End: 2022-12-19

## 2022-12-19 ENCOUNTER — NON-APPOINTMENT (OUTPATIENT)
Age: 47
End: 2022-12-19

## 2022-12-19 VITALS
RESPIRATION RATE: 12 BRPM | HEIGHT: 66 IN | SYSTOLIC BLOOD PRESSURE: 170 MMHG | HEART RATE: 86 BPM | WEIGHT: 228 LBS | DIASTOLIC BLOOD PRESSURE: 100 MMHG | BODY MASS INDEX: 36.64 KG/M2

## 2022-12-19 PROCEDURE — 93000 ELECTROCARDIOGRAM COMPLETE: CPT

## 2022-12-19 PROCEDURE — 99214 OFFICE O/P EST MOD 30 MIN: CPT | Mod: 25

## 2022-12-19 NOTE — DISCUSSION/SUMMARY
[EKG obtained to assist in diagnosis and management of assessed problem(s)] : EKG obtained to assist in diagnosis and management of assessed problem(s) [Hypertension] : hypertension [Not Responding to Treatment] : not responding to treatment [Basic Metabolic Panel] : basic metabolic panel [Thyroid Function Tests] : thyroid function tests [ECG] : ECG [Continue] : continuing vasodilators [Exercise Regimen] : an exercise regimen [Dietary Modification] : dietary modification [Home Monitor] : a home monitor [Weight Loss] : weight loss [Low Sodium Diet] : low sodium diet [NSAIDs Avoidance] : non-steroidal anti-inflammatory drugs avoidance [Patient] : the patient [FreeTextEntry1] : Hypertension: The impression is hypertension. Currently, the condition is needs improvement . The plan was discussed with the patient.meds added for BP \par Diabetes - blood work reviewed with patient. Maintain a low caloric, low sodium, low cholesterol diet. Dietary counseling given, diet and exercise discussed in detail, weight loss recommended.\par Rybelsus added for Obesity and AODM \par

## 2022-12-19 NOTE — ADDENDUM
[FreeTextEntry1] : I, Stevie Monk, assisted in documentation on 12/19/2022 acting as a scribe for Dr. Chino Arauz.\par \par

## 2022-12-19 NOTE — ASSESSMENT
[FreeTextEntry1] : Diabetes mellitus (250.00) (E11.9)\par Benign essential HTN (401.1) (I10)\par Obesity (BMI 30-39.9) (278.00) (E66.9)\par \par HTN \par Poor BP control will add dine .1 bid \par Covid resolving \par

## 2022-12-19 NOTE — HISTORY OF PRESENT ILLNESS
[FreeTextEntry1] : 11/23/22\par seen earlier this week for loose BM no has frequent solid BM \par no fever \par no chills \par feells tired on BB for BP control \par 12/19/22\par Denies Chest Pain, SOB, Dizziness, Leg edema, Orthopnea, PND, Palpitations, Syncope, GUY, Diaphoresis.\par Patient denies change in appetite, fatigue, heat or cold intolerance, myalgias, polydipsia, polyphagia, polyuria, tingling, numbness\par recent covid infection sinus pain and cough now resolving \par was on Paxlovid and mucinex

## 2022-12-20 ENCOUNTER — NON-APPOINTMENT (OUTPATIENT)
Age: 47
End: 2022-12-20

## 2022-12-20 LAB
25(OH)D3 SERPL-MCNC: 41.8 NG/ML
ALBUMIN SERPL ELPH-MCNC: 4.3 G/DL
ALP BLD-CCNC: 76 U/L
ALT SERPL-CCNC: 9 U/L
ANION GAP SERPL CALC-SCNC: 9 MMOL/L
AST SERPL-CCNC: 12 U/L
BASOPHILS # BLD AUTO: 0.04 K/UL
BASOPHILS NFR BLD AUTO: 0.4 %
BILIRUB SERPL-MCNC: <0.2 MG/DL
BUN SERPL-MCNC: 15 MG/DL
CALCIUM SERPL-MCNC: 9.9 MG/DL
CHLORIDE SERPL-SCNC: 101 MMOL/L
CHOLEST SERPL-MCNC: 187 MG/DL
CO2 SERPL-SCNC: 27 MMOL/L
CREAT SERPL-MCNC: 0.89 MG/DL
EGFR: 80 ML/MIN/1.73M2
EOSINOPHIL # BLD AUTO: 0.17 K/UL
EOSINOPHIL NFR BLD AUTO: 1.6 %
ESTIMATED AVERAGE GLUCOSE: 126 MG/DL
FOLATE SERPL-MCNC: 6.8 NG/ML
GLUCOSE SERPL-MCNC: 99 MG/DL
HBA1C MFR BLD HPLC: 6 %
HCT VFR BLD CALC: 39.8 %
HDLC SERPL-MCNC: 62 MG/DL
HGB BLD-MCNC: 13 G/DL
IMM GRANULOCYTES NFR BLD AUTO: 1.6 %
LDLC SERPL CALC-MCNC: 102 MG/DL
LYMPHOCYTES # BLD AUTO: 2.7 K/UL
LYMPHOCYTES NFR BLD AUTO: 26 %
MAN DIFF?: NORMAL
MCHC RBC-ENTMCNC: 29.3 PG
MCHC RBC-ENTMCNC: 32.7 GM/DL
MCV RBC AUTO: 89.8 FL
MONOCYTES # BLD AUTO: 0.7 K/UL
MONOCYTES NFR BLD AUTO: 6.7 %
NEUTROPHILS # BLD AUTO: 6.6 K/UL
NEUTROPHILS NFR BLD AUTO: 63.7 %
NONHDLC SERPL-MCNC: 125 MG/DL
PLATELET # BLD AUTO: 271 K/UL
POTASSIUM SERPL-SCNC: 4.3 MMOL/L
PROT SERPL-MCNC: 6.8 G/DL
RBC # BLD: 4.43 M/UL
RBC # FLD: 13.6 %
SODIUM SERPL-SCNC: 137 MMOL/L
T3 SERPL-MCNC: 104 NG/DL
T3FREE SERPL-MCNC: 3.07 PG/ML
T3RU NFR SERPL: 1 TBI
T4 FREE SERPL-MCNC: 1.3 NG/DL
T4 SERPL-MCNC: 6.4 UG/DL
TRIGL SERPL-MCNC: 113 MG/DL
TSH SERPL-ACNC: 0.91 UIU/ML
VIT B12 SERPL-MCNC: 736 PG/ML
WBC # FLD AUTO: 10.38 K/UL

## 2022-12-21 ENCOUNTER — NON-APPOINTMENT (OUTPATIENT)
Age: 47
End: 2022-12-21

## 2022-12-22 ENCOUNTER — NON-APPOINTMENT (OUTPATIENT)
Age: 47
End: 2022-12-22

## 2023-01-04 RX ORDER — ORAL SEMAGLUTIDE 3 MG/1
3 TABLET ORAL
Qty: 30 | Refills: 5 | Status: DISCONTINUED | COMMUNITY
Start: 2022-11-23 | End: 2023-01-04

## 2023-01-09 ENCOUNTER — NON-APPOINTMENT (OUTPATIENT)
Age: 48
End: 2023-01-09

## 2023-01-23 ENCOUNTER — APPOINTMENT (OUTPATIENT)
Dept: HEART AND VASCULAR | Facility: CLINIC | Age: 48
End: 2023-01-23

## 2023-02-22 ENCOUNTER — APPOINTMENT (OUTPATIENT)
Dept: HEART AND VASCULAR | Facility: CLINIC | Age: 48
End: 2023-02-22

## 2023-02-28 ENCOUNTER — APPOINTMENT (OUTPATIENT)
Dept: HEART AND VASCULAR | Facility: CLINIC | Age: 48
End: 2023-02-28
Payer: COMMERCIAL

## 2023-02-28 VITALS
SYSTOLIC BLOOD PRESSURE: 180 MMHG | BODY MASS INDEX: 36.64 KG/M2 | HEART RATE: 68 BPM | DIASTOLIC BLOOD PRESSURE: 106 MMHG | RESPIRATION RATE: 12 BRPM | HEIGHT: 66 IN | WEIGHT: 228 LBS

## 2023-02-28 DIAGNOSIS — R51.9 HEADACHE, UNSPECIFIED: ICD-10-CM

## 2023-02-28 PROCEDURE — 99214 OFFICE O/P EST MOD 30 MIN: CPT

## 2023-02-28 NOTE — HISTORY OF PRESENT ILLNESS
[FreeTextEntry1] : 47-year-old female with past medical history of hypertension comes in for evaluation of headaches.  Patient reports return of the headache she had in the past usually starting in the nape of the neck and radiating to the front of her head.  Usually responds to NSAIDs transiently only to return.  Patient did have a head CT about a year and a half ago which was unchanged from prior imaging.  Denies any aura nasal discharge.

## 2023-02-28 NOTE — DISCUSSION/SUMMARY
[FreeTextEntry1] : 1.  Hypertension: Blood pressure not at goal likely a contributing factor to patient's worsening headaches.  Will increase clonidine 2.2 mg twice daily and add hydrochlorothiazide 25 mg daily.\par 2.  Headaches: See above we will consider referral back to neurology if symptoms do not improve after better blood pressure control.

## 2023-03-14 ENCOUNTER — APPOINTMENT (OUTPATIENT)
Dept: HEART AND VASCULAR | Facility: CLINIC | Age: 48
End: 2023-03-14
Payer: COMMERCIAL

## 2023-03-14 VITALS
BODY MASS INDEX: 36.96 KG/M2 | RESPIRATION RATE: 12 BRPM | DIASTOLIC BLOOD PRESSURE: 110 MMHG | HEART RATE: 88 BPM | HEIGHT: 66 IN | SYSTOLIC BLOOD PRESSURE: 170 MMHG | WEIGHT: 230 LBS

## 2023-03-14 PROCEDURE — 99214 OFFICE O/P EST MOD 30 MIN: CPT

## 2023-03-14 NOTE — HISTORY OF PRESENT ILLNESS
[FreeTextEntry1] : 47-year-old female with a past medical history of hypertension comes in for follow-up.  Since last visit patient has been tolerating the adjustment of medication reports on home monitoring blood pressure within normal limits.  Denies any headaches chest pain shortness of breath PND orthopnea.

## 2023-03-14 NOTE — REVIEW OF SYSTEMS
[Feeling Fatigued] : feeling fatigued [Diarrhea] : diarrhea [Negative] : Heme/Lymph [FreeTextEntry5] : See HPI [de-identified] : See HPI

## 2023-03-14 NOTE — DISCUSSION/SUMMARY
[FreeTextEntry1] : 1.  Hypertension: Blood pressure not at goal.  Patient's blood pressure machine on the left arm was accurate when compared to manual pressure reading.  Will increase labetalol to 300 mg twice daily along with clonidine 0.2 mg twice daily.  Continue hydrochlorothiazide.  We will follow-up in 2 weeks.

## 2023-03-20 ENCOUNTER — RX CHANGE (OUTPATIENT)
Age: 48
End: 2023-03-20

## 2023-03-20 RX ORDER — SEMAGLUTIDE 1.34 MG/ML
2 INJECTION, SOLUTION SUBCUTANEOUS
Qty: 1.5 | Refills: 3 | Status: DISCONTINUED | COMMUNITY
Start: 2023-01-04 | End: 2023-03-20

## 2023-03-28 ENCOUNTER — APPOINTMENT (OUTPATIENT)
Dept: HEART AND VASCULAR | Facility: CLINIC | Age: 48
End: 2023-03-28
Payer: COMMERCIAL

## 2023-03-28 VITALS
SYSTOLIC BLOOD PRESSURE: 136 MMHG | DIASTOLIC BLOOD PRESSURE: 80 MMHG | RESPIRATION RATE: 12 BRPM | HEIGHT: 66 IN | BODY MASS INDEX: 36.8 KG/M2 | WEIGHT: 229 LBS | HEART RATE: 80 BPM

## 2023-03-28 PROCEDURE — 99213 OFFICE O/P EST LOW 20 MIN: CPT

## 2023-03-28 NOTE — HISTORY OF PRESENT ILLNESS
[FreeTextEntry1] : 47-year-old female with a past medical history of hypertension comes in for follow-up.  Since last visit patient has been tolerating the medication adjustment.  Denies any chest pain shortness of breath PND orthopnea.

## 2023-03-28 NOTE — REVIEW OF SYSTEMS
[Feeling Fatigued] : feeling fatigued [Diarrhea] : diarrhea [Negative] : Heme/Lymph [FreeTextEntry5] : See HPI [de-identified] : See HPI

## 2023-03-28 NOTE — DISCUSSION/SUMMARY
[FreeTextEntry1] : 1.  Hypertension: Adequately controlled on current regimen of medication.  Advised to continue maintain a low-salt diet.

## 2023-06-07 ENCOUNTER — APPOINTMENT (OUTPATIENT)
Dept: UROLOGY | Facility: CLINIC | Age: 48
End: 2023-06-07
Payer: COMMERCIAL

## 2023-06-07 VITALS
OXYGEN SATURATION: 97 % | TEMPERATURE: 97.6 F | SYSTOLIC BLOOD PRESSURE: 147 MMHG | HEART RATE: 64 BPM | DIASTOLIC BLOOD PRESSURE: 96 MMHG

## 2023-06-07 DIAGNOSIS — N32.81 OVERACTIVE BLADDER: ICD-10-CM

## 2023-06-07 PROCEDURE — 99204 OFFICE O/P NEW MOD 45 MIN: CPT

## 2023-06-07 PROCEDURE — 51798 US URINE CAPACITY MEASURE: CPT

## 2023-06-07 NOTE — HISTORY OF PRESENT ILLNESS
[FreeTextEntry1] : Name SUINL WAGNER \par MRN [id number] \par  1975 \par Contact Number: [phone]\par -----------------------------------------------------------------------------------------------------------------------------------------\par Date of First Visit: 23\par Referring Provider/PCP: Dr. Chino Arauz\par -----------------------------------------------------------------------------------------------------------------------------------------\par \par CC: urinary incontinence\par \par History of Present Illness: SUNIL WAGNER is a 47 year old female who presents for evaluation of urinary incontinence. Patient reports over the past few weeks patient reports urgency, but then as soon as gets up to urinate will feel a trickle down her leg. Feels like full urination. Has had this in the past but worsened over this time. Nocturia 2-3x, incontinence episodes at night as well if doesn't make it in time. Mild daytime frequency - not worsened over this time. Feels like empties bladder completely. No dysuria. No fevers or chills. Also reports leakage with sneezing, small volume, less bothersome to patient. Reports went to urgent care for urine test and told no infection but no results.\par \par Patient had hysterectomy in  for fibroids.  - one  (twins), one vaginal. \par \par No history of frequent UTIs. 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

## 2023-06-07 NOTE — LETTER BODY
[Dear  ___] : Dear  [unfilled], [Courtesy Letter:] : I had the pleasure of seeing your patient, [unfilled], in my office today. [Please see my note below.] : Please see my note below. [Consult Closing:] : Thank you very much for allowing me to participate in the care of this patient.  If you have any questions, please do not hesitate to contact me. [Sincerely,] : Sincerely, [FreeTextEntry3] : Consuelo Sparks MD\par Director of Robotic Education\par [facility]\par \par [email or web address]\par [phone] ([place])\par [phone]  ([facility])

## 2023-06-07 NOTE — ASSESSMENT
[FreeTextEntry1] : 47 year old with urge incontinence, nocturia - OAB -wet. We discussed that OAB is a clinical diagnosis characterized by the presence of bothersome urinary symptoms. OAB is a symptom complex with a variable and chronic course that needs to be managed over time, that it primarily affects QOL, that there is no single ideal treatment and that available treatments vary in the effort required from the patient as well as in invasiveness, risk of adverse events and reversibility. Patient also reports mild stress incontinence - small volume, not bothersome to patient.\par \par First line treatment is behavioral modifications: bladder training with pelvic floor physical therapy, fluid management, caffeine reduction, dietary changes (avoiding bladder irritants), particularly soda given seems to correlate with patient symptoms. Next line oral anti-muscarinics or oral ß3-adrenoceptor agonists - risks and benefits discussed. Third line PTNS and neuromodulation. Fourth line more invasive surgical treatment. We discussed this an patient would like to start with conservative measures at this time.\par \par \par - UA, urine culture\par - glucose control\par - avoidance bladder triggers, especially soda\par - pelvic floor PT\par - patient would like to hold off on medications at this time - does not want to add another medication to regimen\par - fu 3 months

## 2023-06-07 NOTE — PHYSICAL EXAM
[General Appearance - Well Developed] : well developed [General Appearance - Well Nourished] : well nourished [Normal Appearance] : normal appearance [Well Groomed] : well groomed [General Appearance - In No Acute Distress] : no acute distress [] : no respiratory distress [Respiration, Rhythm And Depth] : normal respiratory rhythm and effort [Exaggerated Use Of Accessory Muscles For Inspiration] : no accessory muscle use [Abdomen Soft] : soft [Abdomen Tenderness] : non-tender [Costovertebral Angle Tenderness] : no ~M costovertebral angle tenderness [Urethral Meatus] : the meatus of the urethra showed no abnormalities [External Female Genitalia] : normal external genitalia [Vagina] : normal vaginal exam [FreeTextEntry1] : no evidence prolapse, no LITA on exam (bladder empty)

## 2023-06-09 LAB
APPEARANCE: CLEAR
BACTERIA: NEGATIVE /HPF
BILIRUBIN URINE: NEGATIVE
BLOOD URINE: NEGATIVE
CALCIUM OXALATE CRYSTALS: PRESENT
CAST: 1 /LPF
COLOR: YELLOW
EPITHELIAL CELLS: 3 /HPF
GLUCOSE QUALITATIVE U: NEGATIVE MG/DL
KETONES URINE: NEGATIVE MG/DL
LEUKOCYTE ESTERASE URINE: NEGATIVE
MICROSCOPIC-UA: NORMAL
NITRITE URINE: NEGATIVE
PH URINE: 5.5
PROTEIN URINE: NEGATIVE MG/DL
RED BLOOD CELLS URINE: 4 /HPF
REVIEW: NORMAL
SPECIFIC GRAVITY URINE: 1.03
UROBILINOGEN URINE: 0.2 MG/DL
WHITE BLOOD CELLS URINE: 1 /HPF

## 2023-06-13 ENCOUNTER — NON-APPOINTMENT (OUTPATIENT)
Age: 48
End: 2023-06-13

## 2023-06-13 DIAGNOSIS — R31.29 OTHER MICROSCOPIC HEMATURIA: ICD-10-CM

## 2023-06-13 LAB — BACTERIA UR CULT: NORMAL

## 2023-07-05 ENCOUNTER — APPOINTMENT (OUTPATIENT)
Dept: UROLOGY | Facility: CLINIC | Age: 48
End: 2023-07-05

## 2023-07-11 ENCOUNTER — APPOINTMENT (OUTPATIENT)
Dept: HEART AND VASCULAR | Facility: CLINIC | Age: 48
End: 2023-07-11

## 2023-07-11 ENCOUNTER — NON-APPOINTMENT (OUTPATIENT)
Age: 48
End: 2023-07-11

## 2023-07-11 ENCOUNTER — APPOINTMENT (OUTPATIENT)
Dept: UROLOGY | Facility: CLINIC | Age: 48
End: 2023-07-11

## 2023-07-12 ENCOUNTER — APPOINTMENT (OUTPATIENT)
Dept: UROLOGY | Facility: CLINIC | Age: 48
End: 2023-07-12
Payer: COMMERCIAL

## 2023-07-12 ENCOUNTER — NON-APPOINTMENT (OUTPATIENT)
Age: 48
End: 2023-07-12

## 2023-07-13 ENCOUNTER — RX RENEWAL (OUTPATIENT)
Age: 48
End: 2023-07-13

## 2023-07-19 ENCOUNTER — APPOINTMENT (OUTPATIENT)
Dept: UROLOGY | Facility: CLINIC | Age: 48
End: 2023-07-19
Payer: COMMERCIAL

## 2023-07-25 ENCOUNTER — NON-APPOINTMENT (OUTPATIENT)
Age: 48
End: 2023-07-25

## 2023-07-25 ENCOUNTER — APPOINTMENT (OUTPATIENT)
Dept: HEART AND VASCULAR | Facility: CLINIC | Age: 48
End: 2023-07-25
Payer: COMMERCIAL

## 2023-07-25 VITALS
SYSTOLIC BLOOD PRESSURE: 142 MMHG | WEIGHT: 227 LBS | DIASTOLIC BLOOD PRESSURE: 90 MMHG | BODY MASS INDEX: 36.48 KG/M2 | HEIGHT: 66 IN

## 2023-07-25 DIAGNOSIS — J02.9 ACUTE PHARYNGITIS, UNSPECIFIED: ICD-10-CM

## 2023-07-25 DIAGNOSIS — U07.1 COVID-19: ICD-10-CM

## 2023-07-25 DIAGNOSIS — J44.9 CHRONIC OBSTRUCTIVE PULMONARY DISEASE, UNSPECIFIED: ICD-10-CM

## 2023-07-25 DIAGNOSIS — J32.9 CHRONIC SINUSITIS, UNSPECIFIED: ICD-10-CM

## 2023-07-25 PROCEDURE — 99214 OFFICE O/P EST MOD 30 MIN: CPT | Mod: 25

## 2023-07-25 PROCEDURE — 93000 ELECTROCARDIOGRAM COMPLETE: CPT

## 2023-07-25 RX ORDER — AMOXICILLIN AND CLAVULANATE POTASSIUM 875; 125 MG/1; MG/1
875-125 TABLET, COATED ORAL
Qty: 14 | Refills: 0 | Status: ACTIVE | COMMUNITY
Start: 2023-07-25 | End: 1900-01-01

## 2023-07-25 RX ORDER — FLUTICASONE PROPIONATE 50 UG/1
50 SPRAY, METERED NASAL DAILY
Qty: 1 | Refills: 3 | Status: ACTIVE | COMMUNITY
Start: 2023-07-25 | End: 1900-01-01

## 2023-07-25 NOTE — DISCUSSION/SUMMARY
[EKG obtained to assist in diagnosis and management of assessed problem(s)] : EKG obtained to assist in diagnosis and management of assessed problem(s) [FreeTextEntry1] : Hypertension: The impression is hypertension. Currently, the condition is not responding to treatment. The diagnostic plan includes basic metabolic panel, thyroid function tests and ECG. Medication management includes continuing ACE inhibitors, continuing beta blockers, continuing aldosterone antagonists and continuing vasodilators. Other planned treatment includes an exercise regimen, dietary modification, a home monitor, weight loss, low sodium diet and non-steroidal anti-inflammatory drugs avoidance. The plan was discussed with the patient. \par Hypertension: The impression is hypertension. Currently, the condition is needs improvement. The plan was discussed with the patient.meds added for BP \par Diabetes - blood work reviewed with patient. Maintain a low caloric, low sodium, low cholesterol diet. Dietary counseling given, diet and exercise discussed in detail, weight loss recommended.\par Rybelsus added for Obesity and AODM \par . \par \par

## 2023-07-25 NOTE — ADDENDUM
[FreeTextEntry1] : I, Stevie Monk, assisted in documentation on 07/25/2023 acting as a scribe for Dr. Chino Arauz.\par \par

## 2023-07-25 NOTE — REVIEW OF SYSTEMS
[Negative] : Heme/Lymph [Headache] : headache [Earache] : earache [Hearing Loss] : no hearing loss [Sore Throat] : no sore throat [Sinus Pressure] : sinus pressure [Tinnitus] : no tinnitus [Cough] : cough [Wheezing] : no wheezing [Coughing Up Blood] : no hemoptysis

## 2023-07-25 NOTE — HISTORY OF PRESENT ILLNESS
[FreeTextEntry1] : 11/23/22\par seen earlier this week for loose BM no has frequent solid BM \par no fever \par no chills \par feells tired on BB for BP control \par 12/19/22\par Denies Chest Pain, SOB, Dizziness, Leg edema, Orthopnea, PND, Palpitations, Syncope, GUY, Diaphoresis.\par Patient denies change in appetite, fatigue, heat or cold intolerance, myalgias, polydipsia, polyphagia, polyuria, tingling, numbness\par recent covid infection sinus pain and cough now resolving \par was on Paxlovid and mucinex \par 07/25/23\par 1 week of cough using inhaler  daily w no benefit \par Tmax 3 days 101 mod ear pain bilt \par no wheeze

## 2023-07-25 NOTE — PHYSICAL EXAM
[Well Developed] : well developed [Well Nourished] : well nourished [No Acute Distress] : no acute distress [Normal Conjunctiva] : normal conjunctiva [Normal Venous Pressure] : normal venous pressure [No Carotid Bruit] : no carotid bruit [Normal S1, S2] : normal S1, S2 [No Murmur] : no murmur [No Rub] : no rub [No Gallop] : no gallop [Clear Lung Fields] : clear lung fields [Good Air Entry] : good air entry [No Respiratory Distress] : no respiratory distress  [Soft] : abdomen soft [Non Tender] : non-tender [No Masses/organomegaly] : no masses/organomegaly [Normal Bowel Sounds] : normal bowel sounds [Normal Gait] : normal gait [No Edema] : no edema [No Cyanosis] : no cyanosis [No Clubbing] : no clubbing [No Varicosities] : no varicosities [No Rash] : no rash [No Skin Lesions] : no skin lesions [Moves all extremities] : moves all extremities [No Focal Deficits] : no focal deficits [Normal Speech] : normal speech [Alert and Oriented] : alert and oriented [Normal memory] : normal memory [de-identified] : nasal conjection tm left ear dusky no light reflex

## 2023-07-26 ENCOUNTER — NON-APPOINTMENT (OUTPATIENT)
Age: 48
End: 2023-07-26

## 2023-07-26 LAB
HPIV3 RNA SPEC QL NAA+PROBE: DETECTED
RAPID RVP RESULT: DETECTED
SARS-COV-2 RNA PNL RESP NAA+PROBE: NOT DETECTED

## 2023-07-28 ENCOUNTER — APPOINTMENT (OUTPATIENT)
Dept: UROLOGY | Facility: CLINIC | Age: 48
End: 2023-07-28
Payer: COMMERCIAL

## 2023-07-28 ENCOUNTER — NON-APPOINTMENT (OUTPATIENT)
Age: 48
End: 2023-07-28

## 2023-08-01 ENCOUNTER — TRANSCRIPTION ENCOUNTER (OUTPATIENT)
Age: 48
End: 2023-08-01

## 2023-08-01 RX ORDER — BUDESONIDE AND FORMOTEROL FUMARATE DIHYDRATE 160; 4.5 UG/1; UG/1
160-4.5 AEROSOL RESPIRATORY (INHALATION)
Qty: 1 | Refills: 5 | Status: ACTIVE | COMMUNITY
Start: 2022-01-17 | End: 1900-01-01

## 2023-08-09 ENCOUNTER — APPOINTMENT (OUTPATIENT)
Dept: UROLOGY | Facility: CLINIC | Age: 48
End: 2023-08-09
Payer: COMMERCIAL

## 2023-08-09 ENCOUNTER — RX CHANGE (OUTPATIENT)
Age: 48
End: 2023-08-09

## 2023-08-09 VITALS — HEART RATE: 89 BPM | DIASTOLIC BLOOD PRESSURE: 86 MMHG | SYSTOLIC BLOOD PRESSURE: 135 MMHG | TEMPERATURE: 97.9 F

## 2023-08-09 PROCEDURE — 99212 OFFICE O/P EST SF 10 MIN: CPT | Mod: 25

## 2023-08-09 PROCEDURE — 52000 CYSTOURETHROSCOPY: CPT

## 2023-08-09 RX ORDER — LISINOPRIL 40 MG/1
40 TABLET ORAL
Qty: 30 | Refills: 2 | Status: DISCONTINUED | COMMUNITY
Start: 2023-07-13 | End: 2023-08-09

## 2023-08-10 NOTE — HISTORY OF PRESENT ILLNESS
[FreeTextEntry1] : Name SUNIL WAGNER  MRN [id number]   Jul 1975  Contact Number: [phone] ----------------------------------------------------------------------------------------------------------------------------------------- Date of First Visit: 23 Referring Provider/PCP: Dr. Chino Arauz -----------------------------------------------------------------------------------------------------------------------------------------  CC: urinary incontinence  History of Present Illness: SUNIL WAGNER is a 47 year old female who presents for evaluation of urinary incontinence. Patient reports over the past few weeks patient reports urgency, but then as soon as gets up to urinate will feel a trickle down her leg. Feels like full urination. Has had this in the past but worsened over this time. Nocturia 2-3x, incontinence episodes at night as well if doesn't make it in time. Mild daytime frequency - not worsened over this time. Feels like empties bladder completely. No dysuria. No fevers or chills. Also reports leakage with sneezing, small volume, less bothersome to patient. Reports went to urgent care for urine test and told no infection but no results.  Patient had hysterectomy in  for fibroids.  - one  (twins), one vaginal.   No history of frequent UTIs. No history hematuria.  Bladder triggers: 1.5 cup coffee a day, occasional tea, recently starting drinking soda again around time symptoms worsened, +tomatoes, +spicy foods, no pineapple, +citrus --> reports was on a natural diet for 6 weeks and off soda and then went back on regular diet around when symptoms worsened and main change was soda.  PVR 0 ---------------------------------------------------------------------------------------------------------------------------------------- Interval History (2023):  UA: 4 RBC/hpf. Microhematuria work-up:   Renal US:  1. No evidence of hydronephrosis or renal calculi. The etiology of the hematuria is not determined by this exam. 2. Right renal cyst for which follow-up is not required. 3. Unremarkable ultrasound of the urinary bladder. 4. Incidental finding of a 6.2 x 2.8 cm left adnexal cyst/cystic lesion. Recommend follow-up dedicated transvaginal pelvic ultrasound to further characterize.  Cysto today: no abnormalities  Patient reports has been doing exercises on her own over this time which has helped incontinence - no further episodes. Still with frequency. Still drinking soda, trying to decrease.  Saw gyn today re cyst - deciding between drainage of cyst versus oophorectomy. 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 Static images are provided for review.   COMPARISON:  None available  FINDINGS:   RIGHT KIDNEY: 11.8 cm L x 3.8 cm AP x 5.9 cm W. Normal size, morphology and cortical echotexture.  No hydronephrosis, shadowing calculi or perinephric fluid. 0.5 x 0.9 x 1.3 cm mid to upper pole right renal cyst. 1.8 x 1.8 x 1.5 cm lower pole cyst.  LEFT KIDNEY: 11.5 cm L x 4.9 cm AP x 6.5 cm W. Normal size, morphology and cortical echotexture. No suspicious renal lesion. No hydronephrosis, shadowing calculi or perinephric fluid.  URINARY BLADDER: Well-distended with urine. No sludge, calculi or overt focal mural abnormalities.  Bilateral ureteral jets are identified with color Doppler. Prevoid Volume: 469 mL.  Post Void Residual: 14 mL.   Incidental finding of a 6.2 x 2.8 x 2.8 cm left adnexal cystic lesion.   IMPRESSION:   1. No evidence of hydronephrosis or renal calculi. The etiology of the hematuria is not determined by this exam. 2. Right renal cyst for which follow-up is not required. 3. Unremarkable ultrasound of the urinary bladder. 4. Incidental finding of a 6.2 x 2.8 cm left adnexal cyst/cystic lesion. Recommend follow-up dedicated transvaginal pelvic ultrasound to further characterize.

## 2023-08-10 NOTE — ASSESSMENT
[FreeTextEntry1] : 48 year old with urge incontinence, nocturia - OAB -wet. We discussed that OAB is a clinical diagnosis characterized by the presence of bothersome urinary symptoms. OAB is a symptom complex with a variable and chronic course that needs to be managed over time, that it primarily affects QOL, that there is no single ideal treatment and that available treatments vary in the effort required from the patient as well as in invasiveness, risk of adverse events and reversibility. Patient also reports mild stress incontinence - small volume, not bothersome to patient.  First line treatment is behavioral modifications: bladder training with pelvic floor physical therapy, fluid management, caffeine reduction, dietary changes (avoiding bladder irritants), particularly soda given seems to correlate with patient symptoms. Next line oral anti-muscarinics or oral AA?AA?3-adrenoceptor agonists - risks and benefits discussed. Third line PTNS and neuromodulation. Fourth line more invasive surgical treatment. We discussed this an patient would like to start with conservative measures at this time.  Over this time, patient has done exercises on her own with improvement in incontinence - still with frequency. Working on eliminating bladder triggers. Not interested at medication at this time, would like to continue conservative measures and pelvic floor PT.  Microhematuria work-up with US and cysto no abnormalities. Will repeat UA 1 year.   - glucose control - avoidance bladder triggers, especially soda - pelvic floor PT - patient will work on scheduling - patient would like to hold off on medications at this time - does not want to add another medication to regimen - fu 3 months.  - UA one year

## 2023-11-16 ENCOUNTER — NON-APPOINTMENT (OUTPATIENT)
Age: 48
End: 2023-11-16

## 2023-11-22 ENCOUNTER — RX RENEWAL (OUTPATIENT)
Age: 48
End: 2023-11-22

## 2023-12-06 ENCOUNTER — RX CHANGE (OUTPATIENT)
Age: 48
End: 2023-12-06

## 2023-12-06 RX ORDER — CLONIDINE HYDROCHLORIDE 0.2 MG/1
0.2 TABLET ORAL TWICE DAILY
Qty: 60 | Refills: 5 | Status: DISCONTINUED | COMMUNITY
Start: 2022-12-19 | End: 2023-12-06

## 2023-12-12 ENCOUNTER — APPOINTMENT (OUTPATIENT)
Dept: UROLOGY | Facility: CLINIC | Age: 48
End: 2023-12-12
Payer: COMMERCIAL

## 2023-12-12 VITALS
HEIGHT: 66 IN | OXYGEN SATURATION: 98 % | HEART RATE: 85 BPM | WEIGHT: 230 LBS | BODY MASS INDEX: 36.96 KG/M2 | SYSTOLIC BLOOD PRESSURE: 183 MMHG | RESPIRATION RATE: 16 BRPM | DIASTOLIC BLOOD PRESSURE: 121 MMHG

## 2023-12-12 PROCEDURE — 99213 OFFICE O/P EST LOW 20 MIN: CPT

## 2023-12-13 NOTE — LETTER BODY
[FreeTextEntry3] : Consuelo Sparks MD Director of Robotic Education The [facility]  [email or web address] [phone] ([place]) [phone]  ([facility])

## 2023-12-13 NOTE — ASSESSMENT
[FreeTextEntry1] : 48 year old with urge incontinence, nocturia - OAB -wet. We discussed that OAB is a clinical diagnosis characterized by the presence of bothersome urinary symptoms. OAB is a symptom complex with a variable and chronic course that needs to be managed over time, that it primarily affects QOL, that there is no single ideal treatment and that available treatments vary in the effort required from the patient as well as in invasiveness, risk of adverse events and reversibility. Patient also reports mild stress incontinence - small volume, not bothersome to patient.  First line treatment is behavioral modifications: bladder training with pelvic floor physical therapy, fluid management, caffeine reduction, dietary changes (avoiding bladder irritants), particularly soda given seems to correlate with patient symptoms. Next line oral anti-muscarinics or oral B3-adrenoceptor agonists - risks and benefits discussed. Third line PTNS and neuromodulation. Fourth line more invasive surgical treatment.   Over this time, patient initiated pelvic floor PT which helped a lot but has since stopped and feels symptoms worsened. She is restarting and continues working on eliminating bladder triggers. Not interested at medication at this time, would like to continue conservative measures and pelvic floor PT.  Microhematuria work-up with US and cysto no abnormalities. Will repeat UA 1 year = June 2024.  - glucose control - avoidance bladder triggers, especially soda (has decreased but not stopped) - pelvic floor PT - patient will go back - patient would like to hold off on medications at this time - does not want to add another medication to regimen - fu 3 months - UA June 2024

## 2023-12-13 NOTE — HISTORY OF PRESENT ILLNESS
[FreeTextEntry1] : Name SUNIL WAGNER MRN [id number]  Jul 1975 Contact Number: [phone] ----------------------------------------------------------------------------------------------------------------------------------------- Date of First Visit: 23 Referring Provider/PCP: Dr. Chino Arauz -----------------------------------------------------------------------------------------------------------------------------------------  CC: urinary incontinence  History of Present Illness: SUNIL WAGNER is a 47 year old female who presents for evaluation of urinary incontinence. Patient reports over the past few weeks patient reports urgency, but then as soon as gets up to urinate will feel a trickle down her leg. Feels like full urination. Has had this in the past but worsened over this time. Nocturia 2-3x, incontinence episodes at night as well if doesn't make it in time. Mild daytime frequency - not worsened over this time. Feels like empties bladder completely. No dysuria. No fevers or chills. Also reports leakage with sneezing, small volume, less bothersome to patient. Reports went to urgent care for urine test and told no infection but no results.  Patient had hysterectomy in  for fibroids.  - one  (twins), one vaginal.  No history of frequent UTIs. No history hematuria.  Bladder triggers: 1.5 cup coffee a day, occasional tea, recently starting drinking soda again around time symptoms worsened, +tomatoes, +spicy foods, no pineapple, +citrus --> reports was on a natural diet for 6 weeks and off soda and then went back on regular diet around when symptoms worsened and main change was soda.  PVR 0 ---------------------------------------------------------------------------------------------------------------------------------------- Interval History (2023):  UA: 4 RBC/hpf. Microhematuria work-up:  Renal US: 1. No evidence of hydronephrosis or renal calculi. The etiology of the hematuria is not determined by this exam. 2. Right renal cyst for which follow-up is not required. 3. Unremarkable ultrasound of the urinary bladder. 4. Incidental finding of a 6.2 x 2.8 cm left adnexal cyst/cystic lesion. Recommend follow-up dedicated transvaginal pelvic ultrasound to further characterize.  Cysto today: no abnormalities  Patient reports has been doing exercises on her own over this time which has helped incontinence - no further episodes. Still with frequency. Still drinking soda, trying to decrease.  Saw gyn today re cyst - deciding between drainage of cyst versus oophorectomy. ---------------------------------------------------------------------------------------------------------------------------------------- Interval History (2023):  Patient started pelvic floor PT - reports was working well had done 5 sessions - then therapist went on maternity leave and patient feels like had a relapse. She is going back to pelvic floor PT. Patient has decreased bladder triggers - but has not completely stopped, working on it. Patient wants to keep going with physical therapy - feels like when was consistent was working. Does not want to start meds.  Switched gyn and going for second opinion re cyst.  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 Static images are provided for review.  COMPARISON: None available  FINDINGS: RIGHT KIDNEY: 11.8 cm L x 3.8 cm AP x 5.9 cm W. Normal size, morphology and cortical echotexture. No hydronephrosis, shadowing calculi or perinephric fluid. 0.5 x 0.9 x 1.3 cm mid to upper pole right renal cyst. 1.8 x 1.8 x 1.5 cm lower pole cyst.  LEFT KIDNEY: 11.5 cm L x 4.9 cm AP x 6.5 cm W. Normal size, morphology and cortical echotexture. No suspicious renal lesion. No hydronephrosis, shadowing calculi or perinephric fluid.  URINARY BLADDER: Well-distended with urine. No sludge, calculi or overt focal mural abnormalities. Bilateral ureteral jets are identified with color Doppler. Prevoid Volume: 469 mL. Post Void Residual: 14 mL.  Incidental finding of a 6.2 x 2.8 x 2.8 cm left adnexal cystic lesion.   IMPRESSION: 1. No evidence of hydronephrosis or renal calculi. The etiology of the hematuria is not determined by this exam. 2. Right renal cyst for which follow-up is not required. 3. Unremarkable ultrasound of the urinary bladder. 4. Incidental finding of a 6.2 x 2.8 cm left adnexal cyst/cystic lesion. Recommend follow-up dedicated transvaginal pelvic ultrasound to further characterize.

## 2023-12-14 ENCOUNTER — APPOINTMENT (OUTPATIENT)
Dept: OBGYN | Facility: CLINIC | Age: 48
End: 2023-12-14
Payer: COMMERCIAL

## 2023-12-14 ENCOUNTER — NON-APPOINTMENT (OUTPATIENT)
Age: 48
End: 2023-12-14

## 2023-12-14 VITALS
SYSTOLIC BLOOD PRESSURE: 139 MMHG | OXYGEN SATURATION: 100 % | HEART RATE: 67 BPM | BODY MASS INDEX: 37.61 KG/M2 | DIASTOLIC BLOOD PRESSURE: 90 MMHG | WEIGHT: 233 LBS

## 2023-12-14 PROCEDURE — 99213 OFFICE O/P EST LOW 20 MIN: CPT

## 2024-01-05 ENCOUNTER — APPOINTMENT (OUTPATIENT)
Dept: OBGYN | Facility: CLINIC | Age: 49
End: 2024-01-05
Payer: COMMERCIAL

## 2024-01-05 VITALS
OXYGEN SATURATION: 100 % | SYSTOLIC BLOOD PRESSURE: 189 MMHG | WEIGHT: 231 LBS | BODY MASS INDEX: 37.28 KG/M2 | HEART RATE: 77 BPM | DIASTOLIC BLOOD PRESSURE: 166 MMHG

## 2024-01-05 VITALS — SYSTOLIC BLOOD PRESSURE: 175 MMHG | DIASTOLIC BLOOD PRESSURE: 128 MMHG

## 2024-01-05 DIAGNOSIS — R10.2 PELVIC AND PERINEAL PAIN: ICD-10-CM

## 2024-01-05 DIAGNOSIS — N94.9 UNSPECIFIED CONDITION ASSOCIATED WITH FEMALE GENITAL ORGANS AND MENSTRUAL CYCLE: ICD-10-CM

## 2024-01-05 PROCEDURE — 99202 OFFICE O/P NEW SF 15 MIN: CPT

## 2024-02-02 ENCOUNTER — RX RENEWAL (OUTPATIENT)
Age: 49
End: 2024-02-02

## 2024-02-09 ENCOUNTER — LABORATORY RESULT (OUTPATIENT)
Age: 49
End: 2024-02-09

## 2024-02-09 ENCOUNTER — APPOINTMENT (OUTPATIENT)
Dept: HEART AND VASCULAR | Facility: CLINIC | Age: 49
End: 2024-02-09
Payer: COMMERCIAL

## 2024-02-09 ENCOUNTER — NON-APPOINTMENT (OUTPATIENT)
Age: 49
End: 2024-02-09

## 2024-02-09 VITALS
WEIGHT: 229 LBS | DIASTOLIC BLOOD PRESSURE: 88 MMHG | HEART RATE: 75 BPM | BODY MASS INDEX: 36.8 KG/M2 | SYSTOLIC BLOOD PRESSURE: 132 MMHG | HEIGHT: 66 IN

## 2024-02-09 DIAGNOSIS — E66.9 OBESITY, UNSPECIFIED: ICD-10-CM

## 2024-02-09 DIAGNOSIS — I10 ESSENTIAL (PRIMARY) HYPERTENSION: ICD-10-CM

## 2024-02-09 DIAGNOSIS — Z00.00 ENCOUNTER FOR GENERAL ADULT MEDICAL EXAMINATION W/OUT ABNORMAL FINDINGS: ICD-10-CM

## 2024-02-09 PROCEDURE — G2211 COMPLEX E/M VISIT ADD ON: CPT

## 2024-02-09 PROCEDURE — 93000 ELECTROCARDIOGRAM COMPLETE: CPT

## 2024-02-09 PROCEDURE — ZZZZZ: CPT

## 2024-02-09 PROCEDURE — 93306 TTE W/DOPPLER COMPLETE: CPT

## 2024-02-09 PROCEDURE — 99214 OFFICE O/P EST MOD 30 MIN: CPT

## 2024-02-09 RX ORDER — SPIRONOLACTONE 25 MG/1
25 TABLET ORAL
Qty: 30 | Refills: 5 | Status: ACTIVE | COMMUNITY
Start: 2022-03-28

## 2024-02-09 RX ORDER — SEMAGLUTIDE 1.34 MG/ML
2 INJECTION, SOLUTION SUBCUTANEOUS
Qty: 15 | Refills: 3 | Status: DISCONTINUED | COMMUNITY
Start: 2023-03-20 | End: 2024-02-09

## 2024-02-09 RX ORDER — CLONIDINE HYDROCHLORIDE 0.2 MG/1
0.2 TABLET ORAL
Qty: 180 | Refills: 3 | Status: ACTIVE | COMMUNITY
Start: 2023-12-06

## 2024-02-09 RX ORDER — LISINOPRIL 40 MG/1
40 TABLET ORAL
Qty: 90 | Refills: 3 | Status: ACTIVE | COMMUNITY
Start: 2023-08-09

## 2024-02-09 NOTE — REVIEW OF SYSTEMS
[Headache] : headache [Earache] : earache [Sinus Pressure] : sinus pressure [Cough] : cough [Negative] : Heme/Lymph [Hearing Loss] : no hearing loss [Sore Throat] : no sore throat [Tinnitus] : no tinnitus [Wheezing] : no wheezing [Coughing Up Blood] : no hemoptysis

## 2024-02-09 NOTE — PHYSICAL EXAM
[Well Developed] : well developed [Well Nourished] : well nourished [No Acute Distress] : no acute distress [Normal Conjunctiva] : normal conjunctiva [Normal Venous Pressure] : normal venous pressure [No Carotid Bruit] : no carotid bruit [Normal S1, S2] : normal S1, S2 [No Murmur] : no murmur [No Rub] : no rub [No Gallop] : no gallop [Clear Lung Fields] : clear lung fields [Good Air Entry] : good air entry [No Respiratory Distress] : no respiratory distress  [Soft] : abdomen soft [Non Tender] : non-tender [No Masses/organomegaly] : no masses/organomegaly [Normal Bowel Sounds] : normal bowel sounds [Normal Gait] : normal gait [No Edema] : no edema [No Cyanosis] : no cyanosis [No Clubbing] : no clubbing [No Varicosities] : no varicosities [No Rash] : no rash [No Skin Lesions] : no skin lesions [Moves all extremities] : moves all extremities [No Focal Deficits] : no focal deficits [Normal Speech] : normal speech [Alert and Oriented] : alert and oriented [Normal memory] : normal memory [de-identified] : nasal conjection tm left ear dusky no light reflex

## 2024-02-09 NOTE — DISCUSSION/SUMMARY
[FreeTextEntry1] : Hypertension: The impression is hypertension. Currently, the condition is not responding to treatment. The diagnostic plan includes basic metabolic panel, thyroid function tests and ECG. Medication management includes continuing ACE inhibitors, continuing beta blockers, continuing aldosterone antagonists and continuing vasodilators. Other planned treatment includes an exercise regimen, dietary modification, a home monitor, weight loss, low sodium diet and non-steroidal anti-inflammatory drugs avoidance. The plan was discussed with the patient.  esterol diet. Dietary counseling given, diet and exercise discussed in detail, weight loss recommended. Rybelsus added for Obesity and AODM  . Echo Lvef 65 % Mild MR AI LVH dilated LA    [EKG obtained to assist in diagnosis and management of assessed problem(s)] : EKG obtained to assist in diagnosis and management of assessed problem(s)

## 2024-02-09 NOTE — HISTORY OF PRESENT ILLNESS
[FreeTextEntry1] : 11/23/22 seen earlier this week for loose BM no has frequent solid BM  no fever  no chills  feells tired on BB for BP control  12/19/22 Denies Chest Pain, SOB, Dizziness, Leg edema, Orthopnea, PND, Palpitations, Syncope, GUY, Diaphoresis. Patient denies change in appetite, fatigue, heat or cold intolerance, myalgias, polydipsia, polyphagia, polyuria, tingling, numbness recent covid infection sinus pain and cough now resolving  was on Paxlovid and mucinex  07/25/23 1 week of cough using inhaler  daily w no benefit  Tmax 3 days 101 mod ear pain bilt  no wheeze  2/9/24  seen here for HTN and recurrent uri

## 2024-02-12 ENCOUNTER — NON-APPOINTMENT (OUTPATIENT)
Age: 49
End: 2024-02-12

## 2024-02-12 LAB
25(OH)D3 SERPL-MCNC: 32.5 NG/ML
ALBUMIN SERPL ELPH-MCNC: 4.5 G/DL
ALP BLD-CCNC: 83 U/L
ALT SERPL-CCNC: 7 U/L
ANION GAP SERPL CALC-SCNC: 10 MMOL/L
AST SERPL-CCNC: 11 U/L
BASOPHILS # BLD AUTO: 0.05 K/UL
BASOPHILS NFR BLD AUTO: 0.9 %
BILIRUB SERPL-MCNC: 0.3 MG/DL
BUN SERPL-MCNC: 14 MG/DL
CALCIUM SERPL-MCNC: 9.4 MG/DL
CHLORIDE SERPL-SCNC: 104 MMOL/L
CHOLEST SERPL-MCNC: 170 MG/DL
CO2 SERPL-SCNC: 25 MMOL/L
CREAT SERPL-MCNC: 1.03 MG/DL
EGFR: 67 ML/MIN/1.73M2
EOSINOPHIL # BLD AUTO: 0.13 K/UL
EOSINOPHIL NFR BLD AUTO: 2.2 %
ESTIMATED AVERAGE GLUCOSE: 117 MG/DL
FOLATE SERPL-MCNC: 9.7 NG/ML
GLUCOSE SERPL-MCNC: 110 MG/DL
HBA1C MFR BLD HPLC: 5.7 %
HCT VFR BLD CALC: 40.5 %
HDLC SERPL-MCNC: 61 MG/DL
HGB BLD-MCNC: 13 G/DL
IMM GRANULOCYTES NFR BLD AUTO: 0.2 %
LDLC SERPL CALC-MCNC: 97 MG/DL
LYMPHOCYTES # BLD AUTO: 2.02 K/UL
LYMPHOCYTES NFR BLD AUTO: 34.7 %
MAN DIFF?: NORMAL
MCHC RBC-ENTMCNC: 28.9 PG
MCHC RBC-ENTMCNC: 32.1 GM/DL
MCV RBC AUTO: 90 FL
MONOCYTES # BLD AUTO: 0.32 K/UL
MONOCYTES NFR BLD AUTO: 5.5 %
NEUTROPHILS # BLD AUTO: 3.29 K/UL
NEUTROPHILS NFR BLD AUTO: 56.5 %
NONHDLC SERPL-MCNC: 109 MG/DL
PLATELET # BLD AUTO: 248 K/UL
POTASSIUM SERPL-SCNC: 4.7 MMOL/L
PROT SERPL-MCNC: 7.1 G/DL
RBC # BLD: 4.5 M/UL
RBC # FLD: 13.6 %
SODIUM SERPL-SCNC: 139 MMOL/L
T3FREE SERPL-MCNC: 3.02 PG/ML
T3RU NFR SERPL: 1.1 TBI
T4 FREE SERPL-MCNC: 1.4 NG/DL
T4 SERPL-MCNC: 8.1 UG/DL
TRIGL SERPL-MCNC: 64 MG/DL
TSH SERPL-ACNC: 0.64 UIU/ML
VIT B12 SERPL-MCNC: 600 PG/ML
WBC # FLD AUTO: 5.82 K/UL

## 2024-02-22 NOTE — PLAN
[FreeTextEntry1] : Ms. Malik has had a pelvic US that finds only a 1.9 cm and 2.0 cm simple ovarian cysts.  Neither is large enough to explain her pain and don't warrant surgery.  Ovarian cysts were discussed.  Complex vs simple architecture also discussed.  Images used to demonstrate.  She verbalized understanding.    MRI of the pelvis ordered.  RTO after imaging is completed.
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
## 2024-02-22 NOTE — PHYSICAL EXAM
[Appropriately responsive] : appropriately responsive [Chaperone Present] : A chaperone was present in the examining room during all aspects of the physical examination [Alert] : alert [No Acute Distress] : no acute distress [Soft] : soft [Non-tender] : non-tender [Non-distended] : non-distended [No HSM] : No HSM [No Mass] : no mass [Labia Majora] : normal [Labia Minora] : normal [Normal] : normal [Uterine Adnexae] : normal [FreeTextEntry7] : No rebound or guarding.  No CVA tenderness BL. [Enlarged ___ wks] : not enlarged [Mass ___ cm] : no uterine mass was palpated [Tenderness] : nontender [FreeTextEntry5] : No CMT

## 2024-03-12 ENCOUNTER — APPOINTMENT (OUTPATIENT)
Dept: HEART AND VASCULAR | Facility: CLINIC | Age: 49
End: 2024-03-12
Payer: COMMERCIAL

## 2024-03-12 ENCOUNTER — APPOINTMENT (OUTPATIENT)
Dept: UROLOGY | Facility: CLINIC | Age: 49
End: 2024-03-12

## 2024-03-12 VITALS
DIASTOLIC BLOOD PRESSURE: 88 MMHG | WEIGHT: 226 LBS | SYSTOLIC BLOOD PRESSURE: 144 MMHG | HEART RATE: 70 BPM | HEIGHT: 66 IN | RESPIRATION RATE: 12 BRPM | BODY MASS INDEX: 36.32 KG/M2

## 2024-03-12 DIAGNOSIS — I10 ESSENTIAL (PRIMARY) HYPERTENSION: ICD-10-CM

## 2024-03-12 DIAGNOSIS — J06.9 ACUTE UPPER RESPIRATORY INFECTION, UNSPECIFIED: ICD-10-CM

## 2024-03-12 LAB
FLUAV SPEC QL CULT: NORMAL
FLUBV AG SPEC QL IA: NORMAL
S PYO AG SPEC QL IA: NORMAL

## 2024-03-12 PROCEDURE — 99213 OFFICE O/P EST LOW 20 MIN: CPT

## 2024-03-12 PROCEDURE — 87880 STREP A ASSAY W/OPTIC: CPT | Mod: 59,QW

## 2024-03-12 PROCEDURE — 87804 INFLUENZA ASSAY W/OPTIC: CPT | Mod: 59,QW

## 2024-03-12 RX ORDER — HYDROCHLOROTHIAZIDE 25 MG/1
25 TABLET ORAL DAILY
Qty: 30 | Refills: 3 | Status: DISCONTINUED | COMMUNITY
Start: 2023-02-28 | End: 2024-03-12

## 2024-03-12 NOTE — HISTORY OF PRESENT ILLNESS
[FreeTextEntry1] : 40-year-old female with past medical history of hypertension hypothyroidism comes in for evaluation of URI.  Patient reports 2 days of fever up to 102 max, photophobia extreme fatigue.  Denies any chills myalgias arthralgias.  Does report a very mild sore throat.

## 2024-03-12 NOTE — REVIEW OF SYSTEMS
[Fever] : fever [Feeling Fatigued] : feeling fatigued [FreeTextEntry4] : see hpi [Negative] : Heme/Lymph [FreeTextEntry5] : See HPI [de-identified] : See HPI

## 2024-03-12 NOTE — PHYSICAL EXAM
[Well Developed] : well developed [Well Nourished] : well nourished [No Acute Distress] : no acute distress [Normal Conjunctiva] : normal conjunctiva [Normal Venous Pressure] : normal venous pressure [No Carotid Bruit] : no carotid bruit [Normal S1, S2] : normal S1, S2 [No Rub] : no rub [No Gallop] : no gallop [Clear Lung Fields] : clear lung fields [Murmur] : murmur [No Respiratory Distress] : no respiratory distress  [Good Air Entry] : good air entry [Soft] : abdomen soft [Non Tender] : non-tender [No Masses/organomegaly] : no masses/organomegaly [Normal Bowel Sounds] : normal bowel sounds [Normal Gait] : normal gait [No Edema] : no edema [No Cyanosis] : no cyanosis [No Clubbing] : no clubbing [No Varicosities] : no varicosities [No Rash] : no rash [No Skin Lesions] : no skin lesions [No Focal Deficits] : no focal deficits [Moves all extremities] : moves all extremities [Normal Speech] : normal speech [Alert and Oriented] : alert and oriented [Normal memory] : normal memory [de-identified] : Erythematous tonsils no exudate

## 2024-03-12 NOTE — DISCUSSION/SUMMARY
[FreeTextEntry1] : 1.  Hypertension: Continue current medication 2.  URI: Likely viral we will rule out influenza and strep and send off respiratory viral panel for now advised plenty of warm fluids rest and relaxation and to call or return if symptoms persist or worsen

## 2024-03-13 ENCOUNTER — NON-APPOINTMENT (OUTPATIENT)
Age: 49
End: 2024-03-13

## 2024-03-13 LAB
RAPID RVP RESULT: NOT DETECTED
SARS-COV-2 RNA PNL RESP NAA+PROBE: NOT DETECTED

## 2024-03-15 LAB — BACTERIA THROAT CULT: NORMAL

## 2024-04-02 ENCOUNTER — RX CHANGE (OUTPATIENT)
Age: 49
End: 2024-04-02

## 2024-04-02 RX ORDER — LABETALOL HYDROCHLORIDE 300 MG/1
300 TABLET, FILM COATED ORAL
Qty: 180 | Refills: 3 | Status: DISCONTINUED | COMMUNITY
Start: 2020-06-10 | End: 2024-04-02

## 2024-04-02 RX ORDER — LABETALOL HYDROCHLORIDE 300 MG/1
300 TABLET, FILM COATED ORAL
Qty: 180 | Refills: 3 | Status: ACTIVE | COMMUNITY
Start: 2024-04-02 | End: 1900-01-01

## 2024-07-22 DIAGNOSIS — R39.9 UNSPECIFIED SYMPTOMS AND SIGNS INVOLVING THE GENITOURINARY SYSTEM: ICD-10-CM

## 2024-07-23 ENCOUNTER — APPOINTMENT (OUTPATIENT)
Dept: UROLOGY | Facility: CLINIC | Age: 49
End: 2024-07-23
Payer: COMMERCIAL

## 2024-07-23 VITALS
OXYGEN SATURATION: 99 % | HEART RATE: 70 BPM | HEIGHT: 66 IN | DIASTOLIC BLOOD PRESSURE: 78 MMHG | WEIGHT: 226 LBS | SYSTOLIC BLOOD PRESSURE: 115 MMHG | RESPIRATION RATE: 16 BRPM | BODY MASS INDEX: 36.32 KG/M2

## 2024-07-23 PROCEDURE — 51798 US URINE CAPACITY MEASURE: CPT

## 2024-07-23 PROCEDURE — 99213 OFFICE O/P EST LOW 20 MIN: CPT

## 2024-07-23 NOTE — HISTORY OF PRESENT ILLNESS
[FreeTextEntry1] : Name SUNIL WAGNER MRN [id number]  Jul 1975 Contact Number: [phone] ----------------------------------------------------------------------------------------------------------------------------------------- Date of First Visit: 23 Referring Provider/PCP: Dr. Chino Arauz -----------------------------------------------------------------------------------------------------------------------------------------  CC: urinary incontinence  History of Present Illness: SUNIL WAGNER is a 47 year old female who presents for evaluation of urinary incontinence. Patient reports over the past few weeks patient reports urgency, but then as soon as gets up to urinate will feel a trickle down her leg. Feels like full urination. Has had this in the past but worsened over this time. Nocturia 2-3x, incontinence episodes at night as well if doesn't make it in time. Mild daytime frequency - not worsened over this time. Feels like empties bladder completely. No dysuria. No fevers or chills. Also reports leakage with sneezing, small volume, less bothersome to patient. Reports went to urgent care for urine test and told no infection but no results.  Patient had hysterectomy in  for fibroids.  - one  (twins), one vaginal.  No history of frequent UTIs. No history hematuria.  Bladder triggers: 1.5 cup coffee a day, occasional tea, recently starting drinking soda again around time symptoms worsened, +tomatoes, +spicy foods, no pineapple, +citrus --> reports was on a natural diet for 6 weeks and off soda and then went back on regular diet around when symptoms worsened and main change was soda.  PVR 0 ---------------------------------------------------------------------------------------------------------------------------------------- Interval History (2023):  UA: 4 RBC/hpf. Microhematuria work-up:  Renal US: 1. No evidence of hydronephrosis or renal calculi. The etiology of the hematuria is not determined by this exam. 2. Right renal cyst for which follow-up is not required. 3. Unremarkable ultrasound of the urinary bladder. 4. Incidental finding of a 6.2 x 2.8 cm left adnexal cyst/cystic lesion. Recommend follow-up dedicated transvaginal pelvic ultrasound to further characterize.  Cysto today: no abnormalities  Patient reports has been doing exercises on her own over this time which has helped incontinence - no further episodes. Still with frequency. Still drinking soda, trying to decrease.  Saw gyn today re cyst - deciding between drainage of cyst versus oophorectomy. ---------------------------------------------------------------------------------------------------------------------------------------- Interval History (2023):  Patient started pelvic floor PT - reports was working well had done 5 sessions - then therapist went on maternity leave and patient feels like had a relapse. She is going back to pelvic floor PT. Patient has decreased bladder triggers - but has not completely stopped, working on it. Patient wants to keep going with physical therapy - feels like when was consistent was working. Does not want to start meds.  Switched gyn and going for second opinion re cyst. ---------------------------------------------------------------------------------------------------------------------------------------- Interval History (2024):  Patient saw gyn here - plan was for MRI per chart review - patient did not obtain.  Did pelvic floor PT but has been busy with work so didn't have time to continue. Symptoms: improved from prior, happy with things doing well. Occasional frequency but related to when drank tea, coffee, soda, when eliminated was doing well.  Then this weekend developed pain with urination, small volume voids, increased frequency, small incontinence. No fevers, chills, flank pain. Went to urgent care yesterday- UA-dip nitrite neg, LE +, blood negative. Started patient on abx but she did not take yet and unsure type - wanted to see me first. Patient did take 1 dose of daughters old macrobid. No change in symptoms.   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 Static images are provided for review.  COMPARISON: None available  FINDINGS: RIGHT KIDNEY: 11.8 cm L x 3.8 cm AP x 5.9 cm W. Normal size, morphology and cortical echotexture. No hydronephrosis, shadowing calculi or perinephric fluid. 0.5 x 0.9 x 1.3 cm mid to upper pole right renal cyst. 1.8 x 1.8 x 1.5 cm lower pole cyst.  LEFT KIDNEY: 11.5 cm L x 4.9 cm AP x 6.5 cm W. Normal size, morphology and cortical echotexture. No suspicious renal lesion. No hydronephrosis, shadowing calculi or perinephric fluid.  URINARY BLADDER: Well-distended with urine. No sludge, calculi or overt focal mural abnormalities. Bilateral ureteral jets are identified with color Doppler. Prevoid Volume: 469 mL. Post Void Residual: 14 mL.  Incidental finding of a 6.2 x 2.8 x 2.8 cm left adnexal cystic lesion.   IMPRESSION: 1. No evidence of hydronephrosis or renal calculi. The etiology of the hematuria is not determined by this exam. 2. Right renal cyst for which follow-up is not required. 3. Unremarkable ultrasound of the urinary bladder. 4. Incidental finding of a 6.2 x 2.8 cm left adnexal cyst/cystic lesion. Recommend follow-up dedicated transvaginal pelvic ultrasound to further characterize.

## 2024-07-23 NOTE — LETTER BODY
[Dear  ___] : Dear  [unfilled], [Courtesy Letter:] : I had the pleasure of seeing your patient, [unfilled], in my office today. [Please see my note below.] : Please see my note below. [Consult Closing:] : Thank you very much for allowing me to participate in the care of this patient.  If you have any questions, please do not hesitate to contact me. [Sincerely,] : Sincerely, [FreeTextEntry3] : Consuelo Sparks MD Director of Robotic Education [facility]   [email or web address] [phone]

## 2024-07-23 NOTE — ASSESSMENT
[FreeTextEntry1] : 48 year old with urge incontinence, nocturia - OAB -wet. We discussed that OAB is a clinical diagnosis characterized by the presence of bothersome urinary symptoms. OAB is a symptom complex with a variable and chronic course that needs to be managed over time, that it primarily affects QOL, that there is no single ideal treatment and that available treatments vary in the effort required from the patient as well as in invasiveness, risk of adverse events and reversibility. Patient also reports mild stress incontinence - small volume, not bothersome to patient.  First line treatment is behavioral modifications: bladder training with pelvic floor physical therapy, fluid management, caffeine reduction, dietary changes (avoiding bladder irritants), particularly soda given seems to correlate with patient symptoms. Next line oral anti-muscarinics or oral B3-adrenoceptor agonists - risks and benefits discussed. Third line PTNS and neuromodulation. Fourth line more invasive surgical treatment.  Over this time, patient initiated pelvic floor PT which helped a lot but difficult to maintain with scheduling. Notices big change when avoids bladder triggers and with that combo had been doing well. Not interested at medication at this time, would like to continue conservative measures and pelvic floor PT.  Microhematuria work-up with US and cysto no abnormalities. Plan was for repeat UA 1 year = June 2024. [place] end urine today but patient has acute onset UTI like symptoms - took 1 dose macrobid - if persistent MH will plan to repeat ~ 1 month after treatment. Will send UA/culture today - interpret in context Macrobid dose, and will f/u urine from urgent care.   Plan: - UA culture thought took one dose Macrobid - fu outside culture - will call patient with results - glucose control - avoidance bladder triggers, especially soda (has decreased but not stopped) - pelvic floor PT - patient will go back - patient would like to hold off on medications at this time - does not want to add another medication to regimen - f/u 6 months, though may need repeat urine if persistent MH in setting of infection - will revisit if continues to get UTIs

## 2024-07-24 LAB
APPEARANCE: CLEAR
BACTERIA: ABNORMAL /HPF
BILIRUBIN URINE: NEGATIVE
BLOOD URINE: NEGATIVE
CAST: 2 /LPF
COLOR: YELLOW
EPITHELIAL CELLS: 12 /HPF
GLUCOSE QUALITATIVE U: NEGATIVE MG/DL
KETONES URINE: NEGATIVE MG/DL
LEUKOCYTE ESTERASE URINE: ABNORMAL
MICROSCOPIC-UA: NORMAL
NITRITE URINE: NEGATIVE
PH URINE: 6
PROTEIN URINE: NORMAL MG/DL
RED BLOOD CELLS URINE: 5 /HPF
REVIEW: NORMAL
SPECIFIC GRAVITY URINE: 1.02
UROBILINOGEN URINE: 0.2 MG/DL
WHITE BLOOD CELLS URINE: 46 /HPF

## 2024-07-25 LAB — BACTERIA UR CULT: NORMAL

## 2024-08-12 ENCOUNTER — NON-APPOINTMENT (OUTPATIENT)
Age: 49
End: 2024-08-12

## 2024-08-12 ENCOUNTER — APPOINTMENT (OUTPATIENT)
Dept: HEART AND VASCULAR | Facility: CLINIC | Age: 49
End: 2024-08-12
Payer: COMMERCIAL

## 2024-08-12 VITALS — SYSTOLIC BLOOD PRESSURE: 158 MMHG | DIASTOLIC BLOOD PRESSURE: 95 MMHG

## 2024-08-12 VITALS
DIASTOLIC BLOOD PRESSURE: 100 MMHG | WEIGHT: 226 LBS | HEART RATE: 81 BPM | BODY MASS INDEX: 36.32 KG/M2 | SYSTOLIC BLOOD PRESSURE: 160 MMHG | HEIGHT: 66 IN | RESPIRATION RATE: 16 BRPM

## 2024-08-12 DIAGNOSIS — I10 ESSENTIAL (PRIMARY) HYPERTENSION: ICD-10-CM

## 2024-08-12 PROCEDURE — 93000 ELECTROCARDIOGRAM COMPLETE: CPT

## 2024-08-12 PROCEDURE — G2211 COMPLEX E/M VISIT ADD ON: CPT | Mod: NC

## 2024-08-12 PROCEDURE — 99214 OFFICE O/P EST MOD 30 MIN: CPT | Mod: 25

## 2024-08-12 RX ORDER — HYDROCHLOROTHIAZIDE 25 MG/1
25 TABLET ORAL
Refills: 0 | Status: ACTIVE | COMMUNITY

## 2024-08-12 RX ORDER — ATORVASTATIN CALCIUM 80 MG/1
80 TABLET, FILM COATED ORAL
Refills: 0 | Status: ACTIVE | COMMUNITY

## 2024-08-12 NOTE — PHYSICAL EXAM

## 2024-08-12 NOTE — HISTORY OF PRESENT ILLNESS
[FreeTextEntry1] : 11/23/22 seen earlier this week for loose BM no has frequent solid BM  no fever  no chills  feells tired on BB for BP control  12/19/22 Denies Chest Pain, SOB, Dizziness, Leg edema, Orthopnea, PND, Palpitations, Syncope, GUY, Diaphoresis. Patient denies change in appetite, fatigue, heat or cold intolerance, myalgias, polydipsia, polyphagia, polyuria, tingling, numbness recent covid infection sinus pain and cough now resolving  was on Paxlovid and mucinex  07/25/23 1 week of cough using inhaler  daily w no benefit  Tmax 3 days 101 mod ear pain bilt  no wheeze  2/9/24  seen here for HTN and recurrent uri    08/12/24 was in [facility] 2 weeks ago for sob and copd flare up was given albuterol rx and ekg done and abnl had angio which was normal no CAD was told she had bridging  was taken off clonidine and given Statin  was discharged 7/24/24  has had better control, of BP at home

## 2024-08-12 NOTE — ASSESSMENT
[FreeTextEntry1] : HTN  better control withmeds  will monitor on current regimen will get angio results from [facility]  Lifestyle changes for control of BP reviewed ie wgt reduction, salt restriction, Etoh avoidance, exercise 30 min aerobic activity per day, avoid NSAID use self monitor BP TIW Lifestyle advice for LDL reduction including reduction of red meat consumption concentrating on a plant based diet. 30 min aerobic exercise per day. Calorie reduction to target BMI<25

## 2024-08-12 NOTE — ADDENDUM
[FreeTextEntry1] : Rene Hardwick assisted in documentation on 08/12/24 acting as a scribe for Dr. Chino Arauz.

## 2024-09-09 ENCOUNTER — APPOINTMENT (OUTPATIENT)
Dept: HEART AND VASCULAR | Facility: CLINIC | Age: 49
End: 2024-09-09

## 2024-09-10 ENCOUNTER — APPOINTMENT (OUTPATIENT)
Dept: HEART AND VASCULAR | Facility: CLINIC | Age: 49
End: 2024-09-10
Payer: COMMERCIAL

## 2024-09-10 VITALS
DIASTOLIC BLOOD PRESSURE: 95 MMHG | HEIGHT: 66 IN | RESPIRATION RATE: 16 BRPM | SYSTOLIC BLOOD PRESSURE: 160 MMHG | WEIGHT: 232 LBS | BODY MASS INDEX: 37.28 KG/M2

## 2024-09-10 PROCEDURE — 93000 ELECTROCARDIOGRAM COMPLETE: CPT

## 2024-09-10 PROCEDURE — 99214 OFFICE O/P EST MOD 30 MIN: CPT | Mod: 25

## 2024-09-10 PROCEDURE — G2211 COMPLEX E/M VISIT ADD ON: CPT | Mod: NC

## 2024-09-13 NOTE — HISTORY OF PRESENT ILLNESS
[FreeTextEntry1] : 11/23/22 seen earlier this week for loose BM no has frequent solid BM  no fever  no chills  feells tired on BB for BP control  12/19/22 Denies Chest Pain, SOB, Dizziness, Leg edema, Orthopnea, PND, Palpitations, Syncope, GUY, Diaphoresis. Patient denies change in appetite, fatigue, heat or cold intolerance, myalgias, polydipsia, polyphagia, polyuria, tingling, numbness recent covid infection sinus pain and cough now resolving  was on Paxlovid and mucinex  07/25/23 1 week of cough using inhaler  daily w no benefit  Tmax 3 days 101 mod ear pain bilt  no wheeze  2/9/24  seen here for HTN and recurrent uri    08/12/24 was in [facility] 2 weeks ago for sob and copd flare up was given albuterol rx and ekg done and abnl had angio which was normal no CAD was told she had bridging  was taken off clonidine and given Statin  was discharged 7/24/24  has had better control, of BP at home  09/10/24 on meds for BP  has been off lisinopril as pharmacy wont fill

## 2024-09-13 NOTE — ADDENDUM
[FreeTextEntry1] : Rene Hardwick assisted in documentation on 09/10/24 acting as a scribe for Dr. Chino Arauz.

## 2024-09-13 NOTE — PHYSICAL EXAM

## 2024-09-13 NOTE — DISCUSSION/SUMMARY
[EKG obtained to assist in diagnosis and management of assessed problem(s)] : EKG obtained to assist in diagnosis and management of assessed problem(s) [FreeTextEntry1] : Hypertension: The impression is hypertension. Currently, the condition is not responding to treatment. The diagnostic plan includes basic metabolic panel, thyroid function tests and ECG. Medication management includes continuing ACE inhibitors, continuing beta blockers, continuing aldosterone antagonists and continuing vasodilators. Other planned treatment includes an exercise regimen, dietary modification, a home monitor, weight loss, low sodium diet and non-steroidal anti-inflammatory drugs avoidance. The plan was discussed with the patient.  esterol diet. Dietary counseling given, diet and exercise discussed in detail, weight loss recommended. Rybelsus added for Obesity and AODM  . Echo Lvef 65 % Mild MR AI LVH dilated LA

## 2024-09-13 NOTE — ASSESSMENT
[FreeTextEntry1] : HTN  better control withmeds  will monitor on current regimen will get angio results from [facility]  Lifestyle changes for control of BP reviewed ie wgt reduction, salt restriction, Etoh avoidance, exercise 30 min aerobic activity per day, avoid NSAID use self monitor BP TIW Lifestyle advice for LDL reduction including reduction of red meat consumption concentrating on a plant based diet. 30 min aerobic exercise per day. Calorie reduction to target BMI<25

## 2024-09-13 NOTE — PHYSICAL EXAM

## 2024-09-13 NOTE — HISTORY OF PRESENT ILLNESS
[FreeTextEntry1] : 11/23/22 seen earlier this week for loose BM no has frequent solid BM  no fever  no chills  feells tired on BB for BP control  12/19/22 Denies Chest Pain, SOB, Dizziness, Leg edema, Orthopnea, PND, Palpitations, Syncope, GUY, Diaphoresis. Patient denies change in appetite, fatigue, heat or cold intolerance, myalgias, polydipsia, polyphagia, polyuria, tingling, numbness recent covid infection sinus pain and cough now resolving  was on Paxlovid and mucinex  07/25/23 1 week of cough using inhaler  daily w no benefit  Tmax 3 days 101 mod ear pain bilt  no wheeze  2/9/24  seen here for HTN and recurrent uri    08/12/24 was in [facility] 2 weeks ago for sob and copd flare up was given albuterol rx and ekg done and abnl had angio which was normal no CAD was told she had bridging  was taken off clonidine and given Statin  was discharged 7/24/24  has had better control, of BP at home  09/10/24 on meds for BP  has been off lisinopril as pharmacy wont fill

## 2024-09-13 NOTE — ASSESSMENT
[FreeTextEntry1] : HTN  better control withmeds  will monitor on current regimen will get angio results from [facility]  Lifestyle changes for control of BP reviewed ie wgt reduction, salt restriction, Etoh avoidance, exercise 30 min aerobic activity per day, avoid NSAID use self monitor BP TIW Lifestyle advice for LDL reduction including reduction of red meat consumption concentrating on a plant based diet. 30 min aerobic exercise per day. Calorie reduction to target BMI<25

## 2024-12-31 ENCOUNTER — NON-APPOINTMENT (OUTPATIENT)
Age: 49
End: 2024-12-31

## 2024-12-31 ENCOUNTER — LABORATORY RESULT (OUTPATIENT)
Age: 49
End: 2024-12-31

## 2024-12-31 ENCOUNTER — APPOINTMENT (OUTPATIENT)
Dept: HEART AND VASCULAR | Facility: CLINIC | Age: 49
End: 2024-12-31
Payer: COMMERCIAL

## 2024-12-31 VITALS
HEART RATE: 84 BPM | DIASTOLIC BLOOD PRESSURE: 100 MMHG | SYSTOLIC BLOOD PRESSURE: 155 MMHG | WEIGHT: 228 LBS | RESPIRATION RATE: 16 BRPM | HEIGHT: 66 IN | BODY MASS INDEX: 36.64 KG/M2

## 2024-12-31 DIAGNOSIS — I10 ESSENTIAL (PRIMARY) HYPERTENSION: ICD-10-CM

## 2024-12-31 DIAGNOSIS — J32.9 CHRONIC SINUSITIS, UNSPECIFIED: ICD-10-CM

## 2024-12-31 DIAGNOSIS — Z00.00 ENCOUNTER FOR GENERAL ADULT MEDICAL EXAMINATION W/OUT ABNORMAL FINDINGS: ICD-10-CM

## 2024-12-31 PROCEDURE — 93000 ELECTROCARDIOGRAM COMPLETE: CPT

## 2024-12-31 PROCEDURE — G2211 COMPLEX E/M VISIT ADD ON: CPT | Mod: NC

## 2024-12-31 PROCEDURE — 99214 OFFICE O/P EST MOD 30 MIN: CPT

## 2024-12-31 RX ORDER — AZITHROMYCIN 250 MG/1
250 TABLET, FILM COATED ORAL
Qty: 1 | Refills: 0 | Status: ACTIVE | COMMUNITY
Start: 2024-12-31 | End: 1900-01-01

## 2024-12-31 RX ORDER — ALBUTEROL SULFATE 2.5 MG/3ML
(2.5 MG/3ML) SOLUTION RESPIRATORY (INHALATION)
Qty: 1 | Refills: 3 | Status: ACTIVE | COMMUNITY
Start: 2024-12-31 | End: 1900-01-01

## 2025-01-02 LAB
25(OH)D3 SERPL-MCNC: 22.8 NG/ML
ALBUMIN SERPL ELPH-MCNC: 4.4 G/DL
ALP BLD-CCNC: 93 U/L
ALT SERPL-CCNC: 7 U/L
ANION GAP SERPL CALC-SCNC: 13 MMOL/L
AST SERPL-CCNC: 12 U/L
BASOPHILS # BLD AUTO: 0.04 K/UL
BASOPHILS NFR BLD AUTO: 0.7 %
BILIRUB SERPL-MCNC: 0.4 MG/DL
BUN SERPL-MCNC: 15 MG/DL
CALCIUM SERPL-MCNC: 9.6 MG/DL
CHLORIDE SERPL-SCNC: 104 MMOL/L
CHOLEST SERPL-MCNC: 150 MG/DL
CO2 SERPL-SCNC: 22 MMOL/L
CREAT SERPL-MCNC: 1.02 MG/DL
EGFR: 67 ML/MIN/1.73M2
EOSINOPHIL # BLD AUTO: 0.13 K/UL
EOSINOPHIL NFR BLD AUTO: 2.4 %
ESTIMATED AVERAGE GLUCOSE: 123 MG/DL
FOLATE SERPL-MCNC: 8.4 NG/ML
GLUCOSE SERPL-MCNC: 119 MG/DL
HBA1C MFR BLD HPLC: 5.9 %
HCT VFR BLD CALC: 38.9 %
HDLC SERPL-MCNC: 63 MG/DL
HGB BLD-MCNC: 12.8 G/DL
IMM GRANULOCYTES NFR BLD AUTO: 0.4 %
LDLC SERPL CALC-MCNC: 74 MG/DL
LYMPHOCYTES # BLD AUTO: 1.69 K/UL
LYMPHOCYTES NFR BLD AUTO: 31.1 %
MAN DIFF?: NORMAL
MCHC RBC-ENTMCNC: 28.6 PG
MCHC RBC-ENTMCNC: 32.9 G/DL
MCV RBC AUTO: 87 FL
MONOCYTES # BLD AUTO: 0.34 K/UL
MONOCYTES NFR BLD AUTO: 6.3 %
NEUTROPHILS # BLD AUTO: 3.21 K/UL
NEUTROPHILS NFR BLD AUTO: 59.1 %
NONHDLC SERPL-MCNC: 86 MG/DL
PLATELET # BLD AUTO: 252 K/UL
POTASSIUM SERPL-SCNC: 4.2 MMOL/L
PROT SERPL-MCNC: 7 G/DL
RBC # BLD: 4.47 M/UL
RBC # FLD: 13.5 %
RESP PATH DNA+RNA PNL NPH NAA+NON-PROBE: NOT DETECTED
SARS-COV-2 RNA RESP QL NAA+PROBE: NOT DETECTED
SODIUM SERPL-SCNC: 139 MMOL/L
T3 SERPL-MCNC: 117 NG/DL
T3RU NFR SERPL: 1 TBI
T4 FREE SERPL-MCNC: 1.3 NG/DL
T4 SERPL-MCNC: 8.3 UG/DL
TRIGL SERPL-MCNC: 63 MG/DL
TSH SERPL-ACNC: 0.78 UIU/ML
VIT B12 SERPL-MCNC: 579 PG/ML
WBC # FLD AUTO: 5.43 K/UL

## 2025-01-21 ENCOUNTER — APPOINTMENT (OUTPATIENT)
Dept: UROLOGY | Facility: CLINIC | Age: 50
End: 2025-01-21

## 2025-03-25 ENCOUNTER — APPOINTMENT (OUTPATIENT)
Dept: HEART AND VASCULAR | Facility: CLINIC | Age: 50
End: 2025-03-25

## 2025-03-25 ENCOUNTER — APPOINTMENT (OUTPATIENT)
Dept: HEART AND VASCULAR | Facility: CLINIC | Age: 50
End: 2025-03-25
Payer: COMMERCIAL

## 2025-03-25 ENCOUNTER — NON-APPOINTMENT (OUTPATIENT)
Age: 50
End: 2025-03-25

## 2025-03-25 VITALS
DIASTOLIC BLOOD PRESSURE: 88 MMHG | HEART RATE: 88 BPM | BODY MASS INDEX: 36.64 KG/M2 | WEIGHT: 228 LBS | SYSTOLIC BLOOD PRESSURE: 162 MMHG | HEIGHT: 66 IN | RESPIRATION RATE: 16 BRPM

## 2025-03-25 VITALS — DIASTOLIC BLOOD PRESSURE: 90 MMHG | SYSTOLIC BLOOD PRESSURE: 145 MMHG

## 2025-03-25 DIAGNOSIS — I10 ESSENTIAL (PRIMARY) HYPERTENSION: ICD-10-CM

## 2025-03-25 PROCEDURE — 93306 TTE W/DOPPLER COMPLETE: CPT

## 2025-03-25 PROCEDURE — 93000 ELECTROCARDIOGRAM COMPLETE: CPT

## 2025-03-25 PROCEDURE — 99214 OFFICE O/P EST MOD 30 MIN: CPT

## 2025-03-25 PROCEDURE — G2211 COMPLEX E/M VISIT ADD ON: CPT | Mod: NC

## 2025-06-24 ENCOUNTER — APPOINTMENT (OUTPATIENT)
Dept: HEART AND VASCULAR | Facility: CLINIC | Age: 50
End: 2025-06-24
Payer: COMMERCIAL

## 2025-06-24 VITALS
SYSTOLIC BLOOD PRESSURE: 164 MMHG | HEIGHT: 66 IN | WEIGHT: 228 LBS | BODY MASS INDEX: 36.64 KG/M2 | DIASTOLIC BLOOD PRESSURE: 110 MMHG

## 2025-06-24 PROCEDURE — 99213 OFFICE O/P EST LOW 20 MIN: CPT

## 2025-06-24 RX ORDER — CHLORTHALIDONE 25 MG/1
25 TABLET ORAL DAILY
Refills: 0 | Status: ACTIVE | COMMUNITY

## 2025-06-25 LAB
RESP PATH DNA+RNA PNL NPH NAA+NON-PROBE: NOT DETECTED
SARS-COV-2 RNA RESP QL NAA+PROBE: NOT DETECTED

## 2025-07-22 ENCOUNTER — APPOINTMENT (OUTPATIENT)
Dept: HEART AND VASCULAR | Facility: CLINIC | Age: 50
End: 2025-07-22
Payer: COMMERCIAL

## 2025-07-22 VITALS
HEART RATE: 73 BPM | BODY MASS INDEX: 37.28 KG/M2 | SYSTOLIC BLOOD PRESSURE: 148 MMHG | HEIGHT: 66 IN | WEIGHT: 232 LBS | DIASTOLIC BLOOD PRESSURE: 100 MMHG

## 2025-07-22 VITALS — SYSTOLIC BLOOD PRESSURE: 145 MMHG | DIASTOLIC BLOOD PRESSURE: 90 MMHG

## 2025-07-22 DIAGNOSIS — E66.9 OBESITY, UNSPECIFIED: ICD-10-CM

## 2025-07-22 PROCEDURE — 99214 OFFICE O/P EST MOD 30 MIN: CPT

## 2025-07-22 RX ORDER — NALTREXONE HYDROCHLORIDE AND BUPROPION HYDROCHLORIDE 8; 90 MG/1; MG/1
8-90 TABLET, EXTENDED RELEASE ORAL
Qty: 120 | Refills: 3 | Status: ACTIVE | COMMUNITY
Start: 2025-07-22